# Patient Record
Sex: MALE | Race: WHITE | NOT HISPANIC OR LATINO | Employment: OTHER | URBAN - METROPOLITAN AREA
[De-identification: names, ages, dates, MRNs, and addresses within clinical notes are randomized per-mention and may not be internally consistent; named-entity substitution may affect disease eponyms.]

---

## 2018-06-14 ENCOUNTER — HOSPITAL ENCOUNTER (OUTPATIENT)
Dept: FAMILY MEDICINE CLINIC | Facility: CLINIC | Age: 68
Discharge: HOME | End: 2018-06-14
Attending: FAMILY MEDICINE | Admitting: FAMILY MEDICINE

## 2018-12-07 ENCOUNTER — ON CAMPUS - OUTPATIENT (AMBULATORY)
Dept: URBAN - METROPOLITAN AREA HOSPITAL 2 | Facility: HOSPITAL | Age: 68
End: 2018-12-07
Payer: MEDICARE

## 2018-12-07 VITALS
SYSTOLIC BLOOD PRESSURE: 139 MMHG | SYSTOLIC BLOOD PRESSURE: 122 MMHG | OXYGEN SATURATION: 98 % | SYSTOLIC BLOOD PRESSURE: 111 MMHG | HEART RATE: 74 BPM | DIASTOLIC BLOOD PRESSURE: 72 MMHG | SYSTOLIC BLOOD PRESSURE: 110 MMHG | RESPIRATION RATE: 16 BRPM | DIASTOLIC BLOOD PRESSURE: 76 MMHG | OXYGEN SATURATION: 100 % | RESPIRATION RATE: 18 BRPM | SYSTOLIC BLOOD PRESSURE: 113 MMHG | HEART RATE: 77 BPM | DIASTOLIC BLOOD PRESSURE: 73 MMHG | HEART RATE: 73 BPM | HEART RATE: 76 BPM | WEIGHT: 192 LBS | DIASTOLIC BLOOD PRESSURE: 70 MMHG | OXYGEN SATURATION: 97 % | HEART RATE: 78 BPM | HEIGHT: 72 IN | DIASTOLIC BLOOD PRESSURE: 84 MMHG | OXYGEN SATURATION: 99 % | HEART RATE: 79 BPM | TEMPERATURE: 97.5 F | DIASTOLIC BLOOD PRESSURE: 75 MMHG | SYSTOLIC BLOOD PRESSURE: 115 MMHG | OXYGEN SATURATION: 96 %

## 2018-12-07 DIAGNOSIS — K57.30 DIVERTICULOSIS OF LARGE INTESTINE WITHOUT PERFORATION OR ABS: ICD-10-CM

## 2018-12-07 DIAGNOSIS — Z86.010 PERSONAL HISTORY OF COLONIC POLYPS: ICD-10-CM

## 2018-12-07 DIAGNOSIS — K64.1 SECOND DEGREE HEMORRHOIDS: ICD-10-CM

## 2018-12-07 PROCEDURE — G0105 COLORECTAL SCRN; HI RISK IND: HCPCS | Performed by: INTERNAL MEDICINE

## 2019-07-08 ENCOUNTER — RESULTS ENCOUNTER (OUTPATIENT)
Dept: FAMILY MEDICINE CLINIC | Facility: CLINIC | Age: 69
End: 2019-07-08

## 2019-07-08 DIAGNOSIS — I10 HYPERTENSION, UNSPECIFIED TYPE: ICD-10-CM

## 2019-07-08 DIAGNOSIS — E78.5 HYPERLIPIDEMIA, UNSPECIFIED HYPERLIPIDEMIA TYPE: ICD-10-CM

## 2019-07-08 DIAGNOSIS — Z12.5 SCREENING FOR PROSTATE CANCER: Primary | ICD-10-CM

## 2019-07-08 DIAGNOSIS — Z12.5 SCREENING FOR PROSTATE CANCER: ICD-10-CM

## 2019-07-09 LAB
ALBUMIN SERPL-MCNC: 4.6 G/DL (ref 3.6–4.8)
ALBUMIN/GLOB SERPL: 2.3 {RATIO} (ref 1.2–2.2)
ALP SERPL-CCNC: 82 IU/L (ref 39–117)
ALT SERPL-CCNC: 15 IU/L (ref 0–44)
AMBIG ABBREV CMP14 DEFAULT: NORMAL
AMBIG ABBREV LP DEFAULT: NORMAL
AST SERPL-CCNC: 16 IU/L (ref 0–40)
BILIRUB SERPL-MCNC: 0.7 MG/DL (ref 0–1.2)
BUN SERPL-MCNC: 12 MG/DL (ref 8–27)
BUN/CREAT SERPL: 15 (ref 10–24)
CALCIUM SERPL-MCNC: 9.4 MG/DL (ref 8.6–10.2)
CHLORIDE SERPL-SCNC: 106 MMOL/L (ref 96–106)
CHOLEST SERPL-MCNC: 201 MG/DL (ref 100–199)
CO2 SERPL-SCNC: 21 MMOL/L (ref 20–29)
CREAT SERPL-MCNC: 0.82 MG/DL (ref 0.76–1.27)
GLOBULIN SER CALC-MCNC: 2 G/DL (ref 1.5–4.5)
GLUCOSE SERPL-MCNC: 94 MG/DL (ref 65–99)
HDLC SERPL-MCNC: 41 MG/DL
LDLC SERPL CALC-MCNC: 134 MG/DL (ref 0–99)
POTASSIUM SERPL-SCNC: 4.2 MMOL/L (ref 3.5–5.2)
PROT SERPL-MCNC: 6.6 G/DL (ref 6–8.5)
PSA SERPL-MCNC: 1.4 NG/ML (ref 0–4)
SODIUM SERPL-SCNC: 142 MMOL/L (ref 134–144)
TRIGL SERPL-MCNC: 131 MG/DL (ref 0–149)
VLDLC SERPL CALC-MCNC: 26 MG/DL (ref 5–40)

## 2019-08-05 PROBLEM — M25.562 KNEE PAIN, LEFT: Status: ACTIVE | Noted: 2017-12-11

## 2019-08-05 PROBLEM — I10 BENIGN ESSENTIAL HYPERTENSION: Status: ACTIVE | Noted: 2017-12-11

## 2019-08-05 PROBLEM — K59.00 CONSTIPATION: Status: ACTIVE | Noted: 2018-10-01

## 2019-08-05 PROBLEM — M54.89 OTHER DORSALGIA: Status: ACTIVE | Noted: 2018-06-14

## 2019-08-05 PROBLEM — N40.0 BENIGN PROSTATIC HYPERPLASIA: Status: ACTIVE | Noted: 2018-04-02

## 2019-08-05 PROBLEM — G47.30 SLEEP APNEA: Status: ACTIVE | Noted: 2019-08-05

## 2019-08-05 PROBLEM — N39.0 RECURRENT URINARY TRACT INFECTION: Status: ACTIVE | Noted: 2017-10-10

## 2019-08-05 PROBLEM — E78.5 HYPERLIPIDEMIA: Status: ACTIVE | Noted: 2019-08-05

## 2019-08-05 PROBLEM — M17.12 DEGENERATIVE JOINT DISEASE OF LEFT KNEE: Status: ACTIVE | Noted: 2018-01-11

## 2019-08-05 PROBLEM — L82.1 SEBORRHEIC KERATOSIS: Status: ACTIVE | Noted: 2017-12-11

## 2019-08-05 RX ORDER — DOCUSATE SODIUM 100 MG/1
1 CAPSULE, LIQUID FILLED ORAL 2 TIMES DAILY
COMMUNITY
Start: 2019-04-01

## 2019-08-05 RX ORDER — ASPIRIN 81 MG/1
1 TABLET ORAL DAILY
COMMUNITY
Start: 2019-04-01

## 2019-08-05 RX ORDER — DICLOFENAC SODIUM 75 MG/1
1 TABLET, DELAYED RELEASE ORAL 2 TIMES DAILY PRN
Refills: 5 | COMMUNITY
Start: 2019-05-08 | End: 2019-09-26 | Stop reason: SDUPTHER

## 2019-08-05 RX ORDER — ZINC GLUCONATE 50 MG
1 TABLET ORAL DAILY
COMMUNITY
Start: 2012-05-30

## 2019-08-05 RX ORDER — ACETAMINOPHEN 500 MG
2 TABLET ORAL DAILY
COMMUNITY
Start: 2018-01-11 | End: 2019-08-06

## 2019-08-05 RX ORDER — LISINOPRIL 10 MG/1
1 TABLET ORAL DAILY
Refills: 2 | COMMUNITY
Start: 2019-06-05 | End: 2019-09-05 | Stop reason: SDUPTHER

## 2019-08-05 RX ORDER — LYSINE 500 MG
1 TABLET ORAL DAILY
COMMUNITY
Start: 2019-04-01

## 2019-08-05 RX ORDER — MELATONIN
1 DAILY
COMMUNITY
Start: 2018-01-11

## 2019-08-05 RX ORDER — TAMSULOSIN HYDROCHLORIDE 0.4 MG/1
1 CAPSULE ORAL DAILY
Refills: 3 | COMMUNITY
Start: 2019-05-02 | End: 2019-09-26 | Stop reason: SDUPTHER

## 2019-08-06 ENCOUNTER — OFFICE VISIT (OUTPATIENT)
Dept: FAMILY MEDICINE CLINIC | Facility: CLINIC | Age: 69
End: 2019-08-06

## 2019-08-06 VITALS
HEART RATE: 71 BPM | TEMPERATURE: 97.8 F | BODY MASS INDEX: 27.09 KG/M2 | WEIGHT: 200 LBS | RESPIRATION RATE: 18 BRPM | HEIGHT: 72 IN | SYSTOLIC BLOOD PRESSURE: 111 MMHG | OXYGEN SATURATION: 97 % | DIASTOLIC BLOOD PRESSURE: 71 MMHG

## 2019-08-06 DIAGNOSIS — M51.36 LUMBAR DEGENERATIVE DISC DISEASE: ICD-10-CM

## 2019-08-06 DIAGNOSIS — M54.50 ACUTE LEFT-SIDED LOW BACK PAIN WITHOUT SCIATICA: Primary | ICD-10-CM

## 2019-08-06 PROBLEM — M51.369 LUMBAR DEGENERATIVE DISC DISEASE: Status: ACTIVE | Noted: 2019-08-06

## 2019-08-06 PROCEDURE — 99213 OFFICE O/P EST LOW 20 MIN: CPT | Performed by: FAMILY MEDICINE

## 2019-08-06 RX ORDER — PREDNISONE 20 MG/1
40 TABLET ORAL DAILY
Qty: 10 TABLET | Refills: 0 | Status: SHIPPED | OUTPATIENT
Start: 2019-08-06 | End: 2019-08-11

## 2019-08-06 RX ORDER — CYCLOBENZAPRINE HCL 5 MG
5 TABLET ORAL
Qty: 10 TABLET | Refills: 0 | Status: SHIPPED | OUTPATIENT
Start: 2019-08-06 | End: 2019-09-26

## 2019-08-06 NOTE — PROGRESS NOTES
Subjective   Malvin Bustillo is a 69 y.o. male.   Chief Complaint   Patient presents with   • Back Pain         History of Present Illness   Malvin presents today with  c/o low to mid back pain that radiates to his left lower back.  It started on July 5 when he was cutting wood. He states that the pain has improved. However, when he steps on an unlevel surface or turns quickly, then the pain will worsen, but remains limited to his low back.  He does not have any radiation down into his buttocks or into his legs.  He has no loss of bowel or bladder control.  No loss of leg strength.. He states that he has been using heat with some relief. Typically sees Dr. Green, and he plans to talk to her at her next visit about his future plans.    Patient Active Problem List    Diagnosis Date Noted   • Lumbar degenerative disc disease 08/06/2019     Note Last Updated: 8/6/2019     Imaged on abd series 2018     • Hyperlipidemia 08/05/2019   • Sleep apnea 08/05/2019   • Constipation 10/01/2018   • Other dorsalgia 06/14/2018   • Benign prostatic hyperplasia 04/02/2018   • Degenerative joint disease of left knee 01/11/2018   • Benign essential hypertension 12/11/2017   • Knee pain, left 12/11/2017   • Seborrheic keratosis 12/11/2017   • Recurrent urinary tract infection 10/10/2017   • Adenomatous polyp of colon 06/20/2016   • Body mass index (BMI) of 25.0 to 29.9 12/21/2015   • Intermittent vertigo 12/05/2014   • Fasting hyperglycemia 12/18/2013   • Degenerative joint disease 01[birth year]01/1960           Past Surgical History:   Procedure Laterality Date   • COLONOSCOPY  2013    Recheck 2023   • HEMORRHOIDECTOMY     • INGUINAL HERNIA REPAIR Left    • ORCHIECTOMY Left     secondary due to hernia incarceration   • TONSILLECTOMY AND ADENOIDECTOMY         Current Outpatient Medications:   •  aspirin (ASPIRIN ADULT LOW DOSE) 81 MG EC tablet, Take 1 tablet by mouth Daily., Disp: , Rfl:   •  cholecalciferol (VITAMIN D3) 1000 units tablet, Take 1 tablet  by mouth Daily., Disp: , Rfl:   •  docusate sodium (COLACE) 100 MG capsule, Take 1 capsule by mouth 3 (Three) Times a Day., Disp: , Rfl:   •  Fish Oil-Cholecalciferol (FISH OIL + D3 PO), Take 2 capsules by mouth Daily., Disp: , Rfl:   •  L-Lysine 500 MG tablet tablet, Take 1 tablet by mouth Daily., Disp: , Rfl:   •  MULTIPLE VITAMIN PO, Take 1 tablet by mouth Daily., Disp: , Rfl:   •  Zinc 50 MG tablet, Take 1 tablet by mouth Daily., Disp: , Rfl:   •  cyclobenzaprine (FLEXERIL) 5 MG tablet, Take 1 tablet by mouth every night at bedtime., Disp: 10 tablet, Rfl: 0  •  diclofenac (VOLTAREN) 75 MG EC tablet, Take 1 tablet by mouth 2 (Two) Times a Day As Needed., Disp: , Rfl: 5  •  lisinopril (PRINIVIL,ZESTRIL) 10 MG tablet, Take 1 tablet by mouth Daily., Disp: , Rfl: 2  •  predniSONE (DELTASONE) 20 MG tablet, Take 2 tablets by mouth Daily for 5 days., Disp: 10 tablet, Rfl: 0  •  tamsulosin (FLOMAX) 0.4 MG capsule 24 hr capsule, Take 1 capsule by mouth Daily., Disp: , Rfl: 3  No Known Allergies  Social History     Socioeconomic History   • Marital status:      Spouse name: Not on file   • Number of children: Not on file   • Years of education: Not on file   • Highest education level: Not on file   Tobacco Use   • Smoking status: Former Smoker     Packs/day: 2.00     Types: Cigarettes     Start date:      Last attempt to quit:      Years since quittin.6   • Smokeless tobacco: Never Used   Substance and Sexual Activity   • Alcohol use: Yes     Alcohol/week: 12.6 - 16.8 oz     Types: 21 - 28 Cans of beer per week     Frequency: 4 or more times a week   • Drug use: No     Family History   Problem Relation Age of Onset   • Hypertension Mother    • Coronary artery disease Mother    • Diabetes type II Father    • Heart attack Father    • Hypertension Brother         pacers     The following portions of the patient's history were reviewed and updated as appropriate: allergies, current medications, past family  "history, past medical history, past social history, past surgical history and problem list.    Review of Systems   Constitutional: Negative for chills, fever and unexpected weight loss (intentional loss of 30# over past 3 years).   Respiratory: Negative for cough and shortness of breath.    Cardiovascular: Negative for chest pain and leg swelling.   Gastrointestinal: Negative for abdominal pain.   Endocrine: Negative for cold intolerance and heat intolerance.   Genitourinary: Negative for urinary incontinence.   Musculoskeletal: Negative for gait problem.   Neurological: Negative for dizziness and headache.   Hematological: Does not bruise/bleed easily.       Objective   /71 (BP Location: Left arm, Patient Position: Sitting, Cuff Size: Large Adult)   Pulse 71   Temp 97.8 °F (36.6 °C) (Oral)   Resp 18   Ht 182.9 cm (72\")   Wt 90.7 kg (200 lb)   SpO2 97%   BMI 27.12 kg/m²   Physical Exam   Constitutional: He is oriented to person, place, and time. He appears well-developed and well-nourished.   HENT:   Head: Normocephalic and atraumatic.   Eyes: Conjunctivae and EOM are normal.   Cardiovascular: Normal rate.   Pulmonary/Chest: Effort normal.   Musculoskeletal: Normal range of motion. He exhibits no edema.   Neurological: He is alert and oriented to person, place, and time. He displays normal reflexes. No sensory deficit.   Straight leg raise test negative bilaterally.  He does have palpable muscle spasm in the left lower back.  He has limited flexion of his lumbar spine.  His entire lower back over the lumbar spine is rigid, suggesting significant degeneration               Assessment/Plan   Diagnoses and all orders for this visit:    1. Acute left-sided low back pain without sciatica (Primary)  -     predniSONE (DELTASONE) 20 MG tablet; Take 2 tablets by mouth Daily for 5 days.  Dispense: 10 tablet; Refill: 0    2. Lumbar degenerative disc disease  -     cyclobenzaprine (FLEXERIL) 5 MG tablet; Take 1 " tablet by mouth every night at bedtime.  Dispense: 10 tablet; Refill: 0    Will try Flexeril low-dose at bedtime only.  A burst of prednisone for 5 days.  Continue to use the heating pad on his back.  I provided him to handouts on stretching exercises and back injury prevention.             Return if symptoms worsen or fail to improve.

## 2019-08-06 NOTE — PATIENT INSTRUCTIONS
Back Exercises  The following exercises strengthen the muscles that help to support the back. They also help to keep the lower back flexible. Doing these exercises can help to prevent back pain or lessen existing pain.  If you have back pain or discomfort, try doing these exercises 2-3 times each day or as told by your health care provider. When the pain goes away, do them once each day, but increase the number of times that you repeat the steps for each exercise (do more repetitions). If you do not have back pain or discomfort, do these exercises once each day or as told by your health care provider.  Exercises  Single Knee to Chest  Repeat these steps 3-5 times for each le. Lie on your back on a firm bed or the floor with your legs extended.  2. Bring one knee to your chest. Your other leg should stay extended and in contact with the floor.  3. Hold your knee in place by grabbing your knee or thigh.  4. Pull on your knee until you feel a gentle stretch in your lower back.  5. Hold the stretch for 10-30 seconds.  6. Slowly release and straighten your leg.  Pelvic Tilt  Repeat these steps 5-10 times:  1. Lie on your back on a firm bed or the floor with your legs extended.  2. Bend your knees so they are pointing toward the ceiling and your feet are flat on the floor.  3. Tighten your lower abdominal muscles to press your lower back against the floor. This motion will tilt your pelvis so your tailbone points up toward the ceiling instead of pointing to your feet or the floor.  4. With gentle tension and even breathing, hold this position for 5-10 seconds.  Cat-Cow  Repeat these steps until your lower back becomes more flexible:  1. Get into a hands-and-knees position on a firm surface. Keep your hands under your shoulders, and keep your knees under your hips. You may place padding under your knees for comfort.  2. Let your head hang down, and point your tailbone toward the floor so your lower back becomes  rounded like the back of a cat.  3. Hold this position for 5 seconds.  4. Slowly lift your head and point your tailbone up toward the ceiling so your back forms a sagging arch like the back of a cow.  5. Hold this position for 5 seconds.    Press-Ups  Repeat these steps 5-10 times:  1. Lie on your abdomen (face-down) on the floor.  2. Place your palms near your head, about shoulder-width apart.  3. While you keep your back as relaxed as possible and keep your hips on the floor, slowly straighten your arms to raise the top half of your body and lift your shoulders. Do not use your back muscles to raise your upper torso. You may adjust the placement of your hands to make yourself more comfortable.  4. Hold this position for 5 seconds while you keep your back relaxed.  5. Slowly return to lying flat on the floor.    Bridges  Repeat these steps 10 times:  1. Lie on your back on a firm surface.  2. Bend your knees so they are pointing toward the ceiling and your feet are flat on the floor.  3. Tighten your buttocks muscles and lift your buttocks off of the floor until your waist is at almost the same height as your knees. You should feel the muscles working in your buttocks and the back of your thighs. If you do not feel these muscles, slide your feet 1-2 inches farther away from your buttocks.  4. Hold this position for 3-5 seconds.  5. Slowly lower your hips to the starting position, and allow your buttocks muscles to relax completely.  If this exercise is too easy, try doing it with your arms crossed over your chest.  Abdominal Crunches  Repeat these steps 5-10 times:  1. Lie on your back on a firm bed or the floor with your legs extended.  2. Bend your knees so they are pointing toward the ceiling and your feet are flat on the floor.  3. Cross your arms over your chest.  4. Tip your chin slightly toward your chest without bending your neck.  5. Tighten your abdominal muscles and slowly raise your trunk (torso) high  enough to lift your shoulder blades a tiny bit off of the floor. Avoid raising your torso higher than that, because it can put too much stress on your low back and it does not help to strengthen your abdominal muscles.  6. Slowly return to your starting position.  Back Lifts  Repeat these steps 5-10 times:  1. Lie on your abdomen (face-down) with your arms at your sides, and rest your forehead on the floor.  2. Tighten the muscles in your legs and your buttocks.  3. Slowly lift your chest off of the floor while you keep your hips pressed to the floor. Keep the back of your head in line with the curve in your back. Your eyes should be looking at the floor.  4. Hold this position for 3-5 seconds.  5. Slowly return to your starting position.  Contact a health care provider if:  · Your back pain or discomfort gets much worse when you do an exercise.  · Your back pain or discomfort does not lessen within 2 hours after you exercise.  If you have any of these problems, stop doing these exercises right away. Do not do them again unless your health care provider says that you can.  Get help right away if:  · You develop sudden, severe back pain. If this happens, stop doing the exercises right away. Do not do them again unless your health care provider says that you can.  This information is not intended to replace advice given to you by your health care provider. Make sure you discuss any questions you have with your health care provider.  Document Released: 01/25/2006 Document Revised: 07/31/2018 Document Reviewed: 02/11/2016  [employer] Interactive Patient Education © 2019 [employer]    Back Injury Prevention  Back injuries can be very painful. They can also be difficult to heal. After having one back injury, you are more likely to have another one again. It is important to learn how to avoid injuring or re-injuring your back. The following tips can help you to prevent a back injury.  What actions can I take to prevent  back injuries?  Changes in your diet  Talk with your doctor about what to eat. Some foods can make the bones strong.  · Talk with your doctor about how much calcium and vitamin D you need each day. These nutrients help to prevent weakening of the bones (osteoporosis).  · Eat foods that have calcium. These include:  ? Dairy products.  ? Green leafy vegetables.  ? Food and drinks that have had calcium added to them (fortified).  · Eat foods that have vitamin D. These include:  ? Milk.  ? Food and drinks that have had vitamin D added to them.  · Take other supplements and vitamins only as told by your doctor.  Physical fitness  Physical fitness makes your bones and muscles strong. It also improves your balance and strength.  · Exercise for 30 minutes per day on most days of the week, or as told by your doctor. Make sure to:  ? Do aerobic exercises, such as walking, jogging, biking, or swimming.  ? Do exercises that increase balance and strength, such as natalie chi and yoga.  ? Do stretching exercises. This helps with flexibility.  ? Develop strong belly (abdominal) muscles. Your belly muscles help to support your back.  · Stay at a healthy weight. This lowers your risk of a back injury.  Good posture  Prevent back injuries by developing and maintaining a good posture. To do this:  · Sit up straight and stand up straight. Avoid leaning forward when you sit or hunching over when you stand.  · Choose chairs that have good low-back (lumbar) support.  · If you work at a desk:  ? Sit close to it so you do not need to lean over.  ? Keep your chin tucked in.  ? Keep your neck drawn back.  ? Keep your elbows bent so that your arms make a corner (right angle).  · When you drive:  ? Sit high and close to the steering wheel. Add a low-back support to your car seat, if needed.  ? Take breaks every hour if you are driving for long periods of time.  · Avoid sitting or standing in one position for very long. Take breaks to get up,  stretch, and walk around at least once every hour.  · Sleep on your side with your knees slightly bent, or sleep on your back with a pillow under your knees.    Lifting, twisting, and reaching    · Heavy lifting  ? Avoid heavy lifting, especially lifting over and over again. If you must do heavy lifting:  ? Stretch before lifting.  ? Work slowly.  ? Rest between lifts.  ? Use a tool such as a cart or a krish to move objects if one is available.  ? Make several small trips instead of carrying one heavy load.  ? Ask for help when you need it, especially when moving big objects.  ? Follow these steps when lifting:  ? Stand with your feet shoulder-width apart.  ? Get as close to the object as you can. Do not  a heavy object that is far from your body.  ? Use handles or lifting straps if they are available.  ? Bend at your knees. Squat down, but keep your heels off the floor.  ? Keep your shoulders back. Keep your chin tucked in. Keep your back straight.  ? Lift the object slowly while you tighten the muscles in your legs, belly, and bottom. Keep the object as close to the center of your body as possible.  ? Follow these steps when putting down a heavy load:  ? Stand with your feet shoulder-width apart.  ? Lower the object slowly while you tighten the muscles in your legs, belly, and bottom. Keep the object as close to the center of your body as possible.  ? Keep your shoulders back. Keep your chin tucked in. Keep your back straight.  ? Bend at your knees. Squat down, but keep your heels off the floor.  ? Use handles or lifting straps if they are available.  · Twisting and reaching  ? Avoid lifting heavy objects above your waist.  ? Do not twist at your waist while you are lifting or carrying a load. If you need to turn, move your feet.  ? Do not bend over without bending at your knees.  ? Avoid reaching over your head, across a table, or for an object on a high surface.  Other things to do    · Avoid wet floors  and icy ground. Keep sidewalks clear of ice to prevent falls.  · Do not sleep on a mattress that is too soft or too hard.  · Store heavier objects on shelves at waist level.  · Store lighter objects on lower or higher shelves.  · Find ways to lower your stress, such as:  ? Exercise.  ? Massage.  ? Relaxation techniques.  · Talk with your doctor if you feel anxious or depressed. These conditions can make back pain worse.  · Wear flat heel shoes with cushioned soles.  · Use both shoulder straps when carrying a backpack.  · Do not use any products that contain nicotine or tobacco, such as cigarettes and e-cigarettes. If you need help quitting, ask your doctor.  Summary  · Back injuries can be very painful and difficult to heal.  · You can keep your back healthy by making certain changes. These include eating foods that make bones strong, working on being physically fit, developing a good posture, and lifting heavy objects in a safe way.  This information is not intended to replace advice given to you by your health care provider. Make sure you discuss any questions you have with your health care provider.  Document Released: 06/05/2009 Document Revised: 02/08/2019 Document Reviewed: 02/08/2019  [employer] Â® Interactive Patient Education © 2019 [employer] Â[employer]

## 2019-09-06 RX ORDER — LISINOPRIL 10 MG/1
TABLET ORAL
Qty: 30 TABLET | Refills: 0 | Status: SHIPPED | OUTPATIENT
Start: 2019-09-06 | End: 2019-09-26

## 2019-09-26 ENCOUNTER — OFFICE VISIT (OUTPATIENT)
Dept: FAMILY MEDICINE CLINIC | Facility: CLINIC | Age: 69
End: 2019-09-26

## 2019-09-26 VITALS
DIASTOLIC BLOOD PRESSURE: 67 MMHG | TEMPERATURE: 98 F | HEART RATE: 65 BPM | BODY MASS INDEX: 27.25 KG/M2 | SYSTOLIC BLOOD PRESSURE: 108 MMHG | OXYGEN SATURATION: 96 % | RESPIRATION RATE: 16 BRPM | HEIGHT: 72 IN | WEIGHT: 201.2 LBS

## 2019-09-26 DIAGNOSIS — N40.0 BENIGN PROSTATIC HYPERPLASIA WITHOUT LOWER URINARY TRACT SYMPTOMS: ICD-10-CM

## 2019-09-26 DIAGNOSIS — I10 ESSENTIAL HYPERTENSION: Primary | ICD-10-CM

## 2019-09-26 DIAGNOSIS — E78.5 HYPERLIPIDEMIA, UNSPECIFIED HYPERLIPIDEMIA TYPE: ICD-10-CM

## 2019-09-26 DIAGNOSIS — Z23 NEED FOR IMMUNIZATION AGAINST INFLUENZA: ICD-10-CM

## 2019-09-26 PROBLEM — G47.33 OSA ON CPAP: Status: ACTIVE | Noted: 2019-09-26

## 2019-09-26 PROBLEM — Z99.89 OSA ON CPAP: Status: ACTIVE | Noted: 2019-09-26

## 2019-09-26 PROCEDURE — 99213 OFFICE O/P EST LOW 20 MIN: CPT | Performed by: FAMILY MEDICINE

## 2019-09-26 PROCEDURE — 90653 IIV ADJUVANT VACCINE IM: CPT | Performed by: FAMILY MEDICINE

## 2019-09-26 PROCEDURE — G0008 ADMIN INFLUENZA VIRUS VAC: HCPCS | Performed by: FAMILY MEDICINE

## 2019-09-26 RX ORDER — ROSUVASTATIN CALCIUM 5 MG/1
5 TABLET, COATED ORAL DAILY
Qty: 90 TABLET | Refills: 1 | Status: SHIPPED | OUTPATIENT
Start: 2019-09-26 | End: 2020-01-27 | Stop reason: SDUPTHER

## 2019-09-26 RX ORDER — LISINOPRIL 5 MG/1
5 TABLET ORAL DAILY
Qty: 90 TABLET | Refills: 1 | Status: SHIPPED | OUTPATIENT
Start: 2019-09-26 | End: 2020-01-27 | Stop reason: SDUPTHER

## 2019-09-26 RX ORDER — TAMSULOSIN HYDROCHLORIDE 0.4 MG/1
1 CAPSULE ORAL DAILY
Qty: 90 CAPSULE | Refills: 3 | Status: SHIPPED | OUTPATIENT
Start: 2019-09-26 | End: 2020-01-27 | Stop reason: SDUPTHER

## 2019-09-26 RX ORDER — BENZOYL PEROXIDE 100 MG/ML
1 LIQUID TOPICAL DAILY
Refills: 5 | COMMUNITY
Start: 2019-09-18 | End: 2022-08-26

## 2019-09-26 RX ORDER — CLINDAMYCIN PHOSPHATE 11.9 MG/ML
1 SOLUTION TOPICAL DAILY
Refills: 5 | COMMUNITY
Start: 2019-09-18 | End: 2022-08-26

## 2019-09-26 NOTE — PROGRESS NOTES
Subjective   Malvin Bustillo is a 69 y.o. male.     Chief Complaint   Patient presents with   • Hypertension   • Hyperlipidemia   • Benign Prostatic Hypertrophy         Current Outpatient Medications:   •  aspirin (ASPIRIN ADULT LOW DOSE) 81 MG EC tablet, Take 1 tablet by mouth Daily., Disp: , Rfl:   •  BENZOYL PEROXIDE 10 % external wash, Apply 1 application topically to the appropriate area as directed 2 (Two) Times a Day., Disp: , Rfl: 5  •  cholecalciferol (VITAMIN D3) 1000 units tablet, Take 1 tablet by mouth Daily., Disp: , Rfl:   •  clindamycin (CLEOCIN T) 1 % external solution, Apply 1 application topically to the appropriate area as directed 2 (Two) Times a Day., Disp: , Rfl: 5  •  diclofenac (VOLTAREN) 50 MG EC tablet, Take 1 tablet by mouth 2 (Two) Times a Day As Needed (joint pain)., Disp: 180 tablet, Rfl: 0  •  docusate sodium (COLACE) 100 MG capsule, Take 1 capsule by mouth 3 (Three) Times a Day., Disp: , Rfl:   •  Fish Oil-Cholecalciferol (FISH OIL + D3 PO), Take 2 capsules by mouth Daily., Disp: , Rfl:   •  L-Lysine 500 MG tablet tablet, Take 1 tablet by mouth Daily., Disp: , Rfl:   •  lisinopril (PRINIVIL,ZESTRIL) 5 MG tablet, Take 1 tablet by mouth Daily., Disp: 90 tablet, Rfl: 1  •  MULTIPLE VITAMIN PO, Take 1 tablet by mouth Daily., Disp: , Rfl:   •  tamsulosin (FLOMAX) 0.4 MG capsule 24 hr capsule, Take 1 capsule by mouth Daily., Disp: 90 capsule, Rfl: 3  •  Zinc 50 MG tablet, Take 1 tablet by mouth Daily., Disp: , Rfl:   •  rosuvastatin (CRESTOR) 5 MG tablet, Take 1 tablet by mouth Daily., Disp: 90 tablet, Rfl: 1    Past Medical History:   Diagnosis Date   • Arthritis    • Hyperlipidemia    • JERONIMO on CPAP     Dr. Nur   • PSA     2018 =   • VACCINES     FLU= 2018/ 2019       Past Surgical History:   Procedure Laterality Date   • COLONOSCOPY  2013 2013= TA/ 2018= NEG, Rech 2023   • HEMORRHOIDECTOMY     • INGUINAL HERNIA REPAIR Left    • ORCHIECTOMY Left     secondary due to hernia incarceration    • TONSILLECTOMY AND ADENOIDECTOMY         Family History   Problem Relation Age of Onset   • Hypertension Mother    • Heart disease Mother    • Heart disease Father    • Diabetes Father    • Hypertension Brother    • Heart disease Brother    • No Known Problems Sister        Social History     Socioeconomic History   • Marital status:      Spouse name: Not on file   • Number of children: Not on file   • Years of education: Not on file   • Highest education level: Not on file   Tobacco Use   • Smoking status: Former Smoker     Packs/day: 2.00     Types: Cigarettes     Start date:      Last attempt to quit:      Years since quittin.7   • Smokeless tobacco: Never Used   Substance and Sexual Activity   • Alcohol use: Yes     Alcohol/week: 4.2 oz     Types: 7 Cans of beer per week     Frequency: 4 or more times a week   • Drug use: No       70 y/o C male here for 6mos f/u appt on HTN/ CHOL/ BPH    Pt doing well on current meds/ doses         The following portions of the patient's history were reviewed and updated as appropriate: allergies, current medications, past family history, past medical history, past social history, past surgical history and problem list.    Review of Systems   Constitutional: Negative for activity change, fatigue and unexpected weight gain.   Respiratory: Negative for cough, chest tightness and shortness of breath.    Cardiovascular: Negative for chest pain, palpitations and leg swelling.   Genitourinary: Negative for frequency, nocturia, urgency and urinary incontinence.   Musculoskeletal: Negative for arthralgias and myalgias.   Neurological: Negative for dizziness, facial asymmetry, speech difficulty, weakness, light-headedness, headache, memory problem and confusion.       Vitals:    19 0802   BP: 108/67   Pulse: 65   Resp: 16   Temp: 98 °F (36.7 °C)   SpO2: 96%       Objective   Physical Exam   Constitutional: He is oriented to person, place, and time. He  appears well-developed and well-nourished.   HENT:   Head: Normocephalic and atraumatic.   Neck: Normal range of motion. Neck supple. Normal carotid pulses present. Carotid bruit is not present.   Cardiovascular: Normal rate, regular rhythm, normal heart sounds and intact distal pulses.   No murmur heard.  Pulmonary/Chest: Effort normal and breath sounds normal.   Musculoskeletal: He exhibits no edema.   Neurological: He is alert and oriented to person, place, and time. No cranial nerve deficit.   Skin: Skin is warm and dry. No rash noted.   Psychiatric: He has a normal mood and affect. His behavior is normal. Judgment and thought content normal.   Nursing note and vitals reviewed.        Assessment/Plan   Malvin was seen today for hypertension, hyperlipidemia and benign prostatic hypertrophy.    Diagnoses and all orders for this visit:    Essential hypertension    Hyperlipidemia, unspecified hyperlipidemia type  -     Lipid Panel; Future  -     Comprehensive Metabolic Panel; Future    Benign prostatic hyperplasia without lower urinary tract symptoms    Need for immunization against influenza  -     Fluad Quad >65 years    Other orders  -     lisinopril (PRINIVIL,ZESTRIL) 5 MG tablet; Take 1 tablet by mouth Daily.  -     rosuvastatin (CRESTOR) 5 MG tablet; Take 1 tablet by mouth Daily.  -     tamsulosin (FLOMAX) 0.4 MG capsule 24 hr capsule; Take 1 capsule by mouth Daily.  -     diclofenac (VOLTAREN) 50 MG EC tablet; Take 1 tablet by mouth 2 (Two) Times a Day As Needed (joint pain).

## 2020-01-27 ENCOUNTER — OFFICE VISIT (OUTPATIENT)
Dept: FAMILY MEDICINE CLINIC | Facility: CLINIC | Age: 70
End: 2020-01-27

## 2020-01-27 VITALS
DIASTOLIC BLOOD PRESSURE: 76 MMHG | HEIGHT: 72 IN | RESPIRATION RATE: 16 BRPM | WEIGHT: 204 LBS | TEMPERATURE: 97.8 F | SYSTOLIC BLOOD PRESSURE: 130 MMHG | BODY MASS INDEX: 27.63 KG/M2 | HEART RATE: 72 BPM | OXYGEN SATURATION: 96 %

## 2020-01-27 DIAGNOSIS — I10 BENIGN ESSENTIAL HYPERTENSION: ICD-10-CM

## 2020-01-27 DIAGNOSIS — E78.2 MIXED HYPERLIPIDEMIA: ICD-10-CM

## 2020-01-27 DIAGNOSIS — J01.00 ACUTE NON-RECURRENT MAXILLARY SINUSITIS: ICD-10-CM

## 2020-01-27 DIAGNOSIS — Z99.89 OSA ON CPAP: ICD-10-CM

## 2020-01-27 DIAGNOSIS — G47.33 OSA ON CPAP: ICD-10-CM

## 2020-01-27 DIAGNOSIS — M51.36 LUMBAR DEGENERATIVE DISC DISEASE: Primary | ICD-10-CM

## 2020-01-27 DIAGNOSIS — M15.9 PRIMARY OSTEOARTHRITIS INVOLVING MULTIPLE JOINTS: ICD-10-CM

## 2020-01-27 DIAGNOSIS — N40.0 BENIGN PROSTATIC HYPERPLASIA WITHOUT LOWER URINARY TRACT SYMPTOMS: ICD-10-CM

## 2020-01-27 PROCEDURE — 99214 OFFICE O/P EST MOD 30 MIN: CPT | Performed by: FAMILY MEDICINE

## 2020-01-27 RX ORDER — LISINOPRIL 5 MG/1
5 TABLET ORAL DAILY
Qty: 90 TABLET | Refills: 3 | Status: SHIPPED | OUTPATIENT
Start: 2020-01-27 | End: 2021-03-23

## 2020-01-27 RX ORDER — TAMSULOSIN HYDROCHLORIDE 0.4 MG/1
1 CAPSULE ORAL DAILY
Qty: 90 CAPSULE | Refills: 3 | Status: SHIPPED | OUTPATIENT
Start: 2020-01-27 | End: 2021-02-01

## 2020-01-27 RX ORDER — AZITHROMYCIN 250 MG/1
TABLET, FILM COATED ORAL
Qty: 6 TABLET | Refills: 0 | Status: SHIPPED | OUTPATIENT
Start: 2020-01-27 | End: 2020-07-27

## 2020-01-27 RX ORDER — ROSUVASTATIN CALCIUM 5 MG/1
5 TABLET, COATED ORAL DAILY
Qty: 90 TABLET | Refills: 3 | Status: SHIPPED | OUTPATIENT
Start: 2020-01-27 | End: 2021-03-01

## 2020-01-27 NOTE — PROGRESS NOTES
Subjective   Malvin Bustillo is a 69 y.o. male.   Chief Complaint   Patient presents with   • Hyperlipidemia     Needs labs rechecked for HLD. Fasting.    • Hypertension     Cheks BP at home since BP med was lowered. Lowest = 115/71; Highest = 159/84   • URI     Headache; Sore throat; Clear nasal drainage since last Tuesday; Denies post nasal drip    • Arthritis     Is on Diclofenac and needs r/f; Helps with arthralgia pain        History of Present Illness   Comes in today.  Transferring from Dr. Rosenthal.  Multitude of issues.  Hyperlipidemia was told he needed to have his cholesterol rechecked.  Last I can see he had a lipid panel in July LDL was 134, total cholesterol was 201.  Apparently had some problems with Lipitor and cannot take it.  He is taking Crestor 5 mg a day.    Hypertension-for some reason his lisinopril was lowered from 10-5.  He brings in his blood sugar log today.  Looks like he is checking his blood pressure twice a day largely ranges from 115 on the low end to 1 30-1 35 on the high end.  He is only had 3 numbers above 140.  1 of them was 142, 1 of them was 143 and the other was 159.  Diastolics all ranged from the 60s to low 80s.    Has osteoarthritis and degenerative joint disease including chronic mechanical back pain.  Takes diclofenac for that.  Would like a refill.    Finally he has respiratory symptoms including sore throat, clear nasal drainage for 1 week.  Mild headache.  No fevers or chills.  Also developing a lot of pressure on either side of his nose and over his forehead.  Feels a little off balance when he walks around.  Scant blood on the tissue when he blows his nose.      Patient Active Problem List    Diagnosis Date Noted   • JERONIMO on CPAP 09/26/2019     Note Last Updated: 9/26/2019     Dr. Nur     • Lumbar degenerative disc disease 08/06/2019     Note Last Updated: 8/6/2019     Imaged on abd series 2018     • Mixed hyperlipidemia 08/05/2019   • Sleep apnea 08/05/2019   •  Constipation 10/01/2018   • Other dorsalgia 06/14/2018   • Benign prostatic hyperplasia 04/02/2018   • Degenerative joint disease of left knee 01/11/2018   • Benign essential hypertension 12/11/2017   • Knee pain, left 12/11/2017   • Seborrheic keratosis 12/11/2017   • Recurrent urinary tract infection 10/10/2017   • Adenomatous polyp of colon 06/20/2016   • Body mass index (BMI) of 25.0 to 29.9 12/21/2015   • Intermittent vertigo 12/05/2014   • Fasting hyperglycemia 12/18/2013   • Degenerative joint disease 01/01/1960           Past Surgical History:   Procedure Laterality Date   • COLONOSCOPY  2013 2013= TA/ 2018= NEG, Rech 2023   • HEMORRHOIDECTOMY     • INGUINAL HERNIA REPAIR Left    • ORCHIECTOMY Left     secondary due to hernia incarceration   • TONSILLECTOMY AND ADENOIDECTOMY       Current Outpatient Medications on File Prior to Visit   Medication Sig   • aspirin (ASPIRIN ADULT LOW DOSE) 81 MG EC tablet Take 1 tablet by mouth Daily.   • BENZOYL PEROXIDE 10 % external wash Apply 1 application topically to the appropriate area as directed Daily.   • cholecalciferol (VITAMIN D3) 1000 units tablet Take 1 tablet by mouth Daily.   • clindamycin (CLEOCIN T) 1 % external solution Apply 1 application topically to the appropriate area as directed Daily.   • docusate sodium (COLACE) 100 MG capsule Take 1 capsule by mouth 2 (Two) Times a Day.   • Fish Oil-Cholecalciferol (FISH OIL + D3 PO) Take 2 capsules by mouth Daily.   • L-Lysine 500 MG tablet tablet Take 1 tablet by mouth Daily.   • MULTIPLE VITAMIN PO Take 1 tablet by mouth Daily.   • Zinc 50 MG tablet Take 1 tablet by mouth Daily.   • [DISCONTINUED] diclofenac (VOLTAREN) 50 MG EC tablet Take 1 tablet by mouth 2 (Two) Times a Day As Needed (joint pain).   • [DISCONTINUED] lisinopril (PRINIVIL,ZESTRIL) 5 MG tablet Take 1 tablet by mouth Daily.   • [DISCONTINUED] rosuvastatin (CRESTOR) 5 MG tablet Take 1 tablet by mouth Daily.   • [DISCONTINUED] tamsulosin  (FLOMAX) 0.4 MG capsule 24 hr capsule Take 1 capsule by mouth Daily.     No current facility-administered medications on file prior to visit.      Allergies   Allergen Reactions   • Lipitor [Atorvastatin Calcium] Myalgia     Social History     Socioeconomic History   • Marital status:      Spouse name: Not on file   • Number of children: Not on file   • Years of education: Not on file   • Highest education level: Not on file   Tobacco Use   • Smoking status: Former Smoker     Packs/day: 2.00     Years: 10.00     Pack years: 20.00     Types: Cigarettes     Start date:      Last attempt to quit:      Years since quittin.0   • Smokeless tobacco: Never Used   Substance and Sexual Activity   • Alcohol use: Yes     Alcohol/week: 7.0 standard drinks     Types: 7 Cans of beer per week     Frequency: 4 or more times a week   • Drug use: No     Family History   Problem Relation Age of Onset   • Hypertension Mother    • Heart disease Mother    • Depression Mother    • Osteoporosis Mother    • Skin cancer Mother    • Thyroid disease Mother    • Coronary artery disease Mother    • Heart disease Father    • Diabetes Father    • Coronary artery disease Father    • Hypertension Brother    • Heart disease Brother    • No Known Problems Sister      The following portions of the patient's history were reviewed and updated as appropriate: allergies, current medications, past family history, past medical history, past social history, past surgical history and problem list.    Review of Systems   Constitutional: Positive for fatigue. Negative for chills and fever.   Respiratory: Negative for shortness of breath.    Cardiovascular: Negative for chest pain, palpitations and leg swelling.   Gastrointestinal: Negative for abdominal pain.   Musculoskeletal: Negative for myalgias.   Skin: Negative for pallor.   Neurological: Positive for light-headedness. Negative for numbness.       Objective   /76 (BP Location:  "Right arm, Patient Position: Sitting, Cuff Size: Adult)   Pulse 72   Temp 97.8 °F (36.6 °C) (Oral)   Resp 16   Ht 182.9 cm (72\")   Wt 92.5 kg (204 lb)   SpO2 96%   BMI 27.67 kg/m²   Physical Exam   Constitutional: He is oriented to person, place, and time. He appears well-developed and well-nourished. He does not have a sickly appearance.   HENT:   Head: Normocephalic and atraumatic.   Right Ear: Hearing and ear canal normal. Tympanic membrane is not bulging.   Left Ear: Hearing, external ear and ear canal normal. Tympanic membrane is bulging. A middle ear effusion is present.   Nose: Mucosal edema, rhinorrhea and congestion present. Right sinus exhibits maxillary sinus tenderness and frontal sinus tenderness. Left sinus exhibits maxillary sinus tenderness and frontal sinus tenderness.   Mouth/Throat: Oropharyngeal exudate and posterior oropharyngeal erythema present. No posterior oropharyngeal edema.   Eyes: Pupils are equal, round, and reactive to light. EOM are normal. Right conjunctiva has no hemorrhage. Left conjunctiva has no hemorrhage.   Neck: Neck supple. No JVD present. No thyromegaly present.   Cardiovascular: Normal rate, regular rhythm, normal heart sounds and intact distal pulses.   No murmur heard.  Pulmonary/Chest: Effort normal and breath sounds normal. No respiratory distress. He has no wheezes. He has no rales. He exhibits no tenderness.   Abdominal: Soft. He exhibits no distension and no mass. There is no tenderness. There is no rebound and no guarding.   Musculoskeletal: Normal range of motion. He exhibits no edema.   Lymphadenopathy:     He has no cervical adenopathy.   Neurological: He is alert and oriented to person, place, and time.   Skin: Skin is warm. No rash noted.   Psychiatric: He has a normal mood and affect. His behavior is normal.         Abstract on 01/09/2020   Component Date Value Ref Range Status   • HM Colonoscopy 12/07/2018 SEE REPORT    Final         Assessment/Plan "   Diagnoses and all orders for this visit:    1. Lumbar degenerative disc disease (Primary)  -     diclofenac (VOLTAREN) 50 MG EC tablet; Take 1 tablet by mouth 2 (Two) Times a Day As Needed (joint pain).  Dispense: 180 tablet; Refill: 3    2. Primary osteoarthritis involving multiple joints    3. JERONIMO on CPAP    4. Mixed hyperlipidemia  -     Lipid Panel  -     Comprehensive Metabolic Panel  -     rosuvastatin (CRESTOR) 5 MG tablet; Take 1 tablet by mouth Daily.  Dispense: 90 tablet; Refill: 3    5. Benign essential hypertension  -     lisinopril (PRINIVIL,ZESTRIL) 5 MG tablet; Take 1 tablet by mouth Daily.  Dispense: 90 tablet; Refill: 3    6. Benign prostatic hyperplasia without lower urinary tract symptoms  Comments:  PSA followed by DR. Elias in July  Orders:  -     tamsulosin (FLOMAX) 0.4 MG capsule 24 hr capsule; Take 1 capsule by mouth Daily.  Dispense: 90 capsule; Refill: 3    7. Acute non-recurrent maxillary sinusitis  -     azithromycin (ZITHROMAX Z-DORIS) 250 MG tablet; Take 2 tablets the first day, then 1 tablet daily for 4 days.  Dispense: 6 tablet; Refill: 0    Refill all current medications.  I am happy with the current level of blood pressure control.  He is tolerating the Crestor without side effects.  We will recheck his lipid panel and see how effective that has been.  He gets his PSA done through urology every July.  Continue that.  He seems to have no problems with the diclofenac.  He has had no instances of abnormal bleeding.  No increased heartburn.  We will therefore recommend he continue all current medications at the present time.  If symptoms of sinus infection do not improve in the next few days, let me know.  Have recommended he also drink plenty of water and add Mucinex 1 pill twice daily for about a week.             Return in about 6 months (around 7/27/2020).    Call with any problems or concerns before next visit

## 2020-01-28 ENCOUNTER — TELEPHONE (OUTPATIENT)
Dept: FAMILY MEDICINE CLINIC | Facility: CLINIC | Age: 70
End: 2020-01-28

## 2020-01-28 LAB
ALBUMIN SERPL-MCNC: 4.7 G/DL (ref 3.8–4.8)
ALBUMIN/GLOB SERPL: 2 {RATIO} (ref 1.2–2.2)
ALP SERPL-CCNC: 104 IU/L (ref 39–117)
ALT SERPL-CCNC: 20 IU/L (ref 0–44)
AST SERPL-CCNC: 18 IU/L (ref 0–40)
BILIRUB SERPL-MCNC: 0.6 MG/DL (ref 0–1.2)
BUN SERPL-MCNC: 13 MG/DL (ref 8–27)
BUN/CREAT SERPL: 13 (ref 10–24)
CALCIUM SERPL-MCNC: 9.8 MG/DL (ref 8.6–10.2)
CHLORIDE SERPL-SCNC: 104 MMOL/L (ref 96–106)
CHOLEST SERPL-MCNC: 140 MG/DL (ref 100–199)
CO2 SERPL-SCNC: 22 MMOL/L (ref 20–29)
CREAT SERPL-MCNC: 0.98 MG/DL (ref 0.76–1.27)
GLOBULIN SER CALC-MCNC: 2.4 G/DL (ref 1.5–4.5)
GLUCOSE SERPL-MCNC: 90 MG/DL (ref 65–99)
HDLC SERPL-MCNC: 39 MG/DL
LDLC SERPL CALC-MCNC: 81 MG/DL (ref 0–99)
POTASSIUM SERPL-SCNC: 4.2 MMOL/L (ref 3.5–5.2)
PROT SERPL-MCNC: 7.1 G/DL (ref 6–8.5)
SODIUM SERPL-SCNC: 142 MMOL/L (ref 134–144)
TRIGL SERPL-MCNC: 101 MG/DL (ref 0–149)
VLDLC SERPL CALC-MCNC: 20 MG/DL (ref 5–40)

## 2020-01-28 NOTE — TELEPHONE ENCOUNTER
Called and s/w patient. Patient's identity verified. Advised him of lab results. Voiced understanding. No concerns noted.

## 2020-01-28 NOTE — TELEPHONE ENCOUNTER
----- Message from Lidia Hirsch MD sent at 1/28/2020 10:59 AM EST -----  Please tell Malvin that his blood work looked excellent.  The Crestor is doing the job well.  Total cholesterol is 140, down from 201.  LDL is now 81, down from 134.  Blood sugar was normal at 90.  Kidney and liver tests are all normal.  Recommend continuing Crestor at 5 mg/day, as well as all his other medicines.  Thanks

## 2020-07-10 ENCOUNTER — LAB (OUTPATIENT)
Dept: FAMILY MEDICINE CLINIC | Facility: CLINIC | Age: 70
End: 2020-07-10

## 2020-07-10 DIAGNOSIS — I10 BENIGN ESSENTIAL HYPERTENSION: ICD-10-CM

## 2020-07-10 DIAGNOSIS — E78.2 MIXED HYPERLIPIDEMIA: Primary | ICD-10-CM

## 2020-07-11 LAB
ALBUMIN SERPL-MCNC: 4.8 G/DL (ref 3.8–4.8)
ALBUMIN/GLOB SERPL: 2.3 {RATIO} (ref 1.2–2.2)
ALP SERPL-CCNC: 74 IU/L (ref 39–117)
ALT SERPL-CCNC: 29 IU/L (ref 0–44)
AST SERPL-CCNC: 21 IU/L (ref 0–40)
BILIRUB SERPL-MCNC: 0.8 MG/DL (ref 0–1.2)
BUN SERPL-MCNC: 15 MG/DL (ref 8–27)
BUN/CREAT SERPL: 14 (ref 10–24)
CALCIUM SERPL-MCNC: 9.3 MG/DL (ref 8.6–10.2)
CHLORIDE SERPL-SCNC: 103 MMOL/L (ref 96–106)
CHOLEST SERPL-MCNC: 136 MG/DL (ref 100–199)
CO2 SERPL-SCNC: 23 MMOL/L (ref 20–29)
CREAT SERPL-MCNC: 1.04 MG/DL (ref 0.76–1.27)
GLOBULIN SER CALC-MCNC: 2.1 G/DL (ref 1.5–4.5)
GLUCOSE SERPL-MCNC: 86 MG/DL (ref 65–99)
HDLC SERPL-MCNC: 39 MG/DL
LDLC SERPL CALC-MCNC: 78 MG/DL (ref 0–99)
POTASSIUM SERPL-SCNC: 4.3 MMOL/L (ref 3.5–5.2)
PROT SERPL-MCNC: 6.9 G/DL (ref 6–8.5)
PSA SERPL-MCNC: 1.4 NG/ML (ref 0–4)
SODIUM SERPL-SCNC: 139 MMOL/L (ref 134–144)
TRIGL SERPL-MCNC: 94 MG/DL (ref 0–149)
VLDLC SERPL CALC-MCNC: 19 MG/DL (ref 5–40)

## 2020-07-27 ENCOUNTER — OFFICE VISIT (OUTPATIENT)
Dept: FAMILY MEDICINE CLINIC | Facility: CLINIC | Age: 70
End: 2020-07-27

## 2020-07-27 VITALS
HEIGHT: 72 IN | DIASTOLIC BLOOD PRESSURE: 71 MMHG | HEART RATE: 71 BPM | OXYGEN SATURATION: 99 % | SYSTOLIC BLOOD PRESSURE: 111 MMHG | WEIGHT: 195.2 LBS | TEMPERATURE: 97.8 F | BODY MASS INDEX: 26.44 KG/M2

## 2020-07-27 DIAGNOSIS — I10 BENIGN ESSENTIAL HYPERTENSION: Primary | ICD-10-CM

## 2020-07-27 DIAGNOSIS — G47.33 OSA ON CPAP: ICD-10-CM

## 2020-07-27 DIAGNOSIS — E78.2 MIXED HYPERLIPIDEMIA: ICD-10-CM

## 2020-07-27 DIAGNOSIS — Z99.89 OSA ON CPAP: ICD-10-CM

## 2020-07-27 DIAGNOSIS — R73.01 FASTING HYPERGLYCEMIA: ICD-10-CM

## 2020-07-27 DIAGNOSIS — Q67.7 CONGENITAL PECTUS CARINATUM: ICD-10-CM

## 2020-07-27 DIAGNOSIS — Z13.6 ENCOUNTER FOR ABDOMINAL AORTIC ANEURYSM (AAA) SCREENING: ICD-10-CM

## 2020-07-27 DIAGNOSIS — Z87.891 FORMER SMOKER: ICD-10-CM

## 2020-07-27 DIAGNOSIS — R09.89 OTHER SPECIFIED SYMPTOMS AND SIGNS INVOLVING THE CIRCULATORY AND RESPIRATORY SYSTEMS: ICD-10-CM

## 2020-07-27 PROCEDURE — 99214 OFFICE O/P EST MOD 30 MIN: CPT | Performed by: FAMILY MEDICINE

## 2020-07-27 NOTE — PROGRESS NOTES
"Subjective   Malvin Bustillo is a 70 y.o. male.   Chief Complaint   Patient presents with   • Hypertension       History of Present Illness   Patient is here for a 6 month f/u on blood pressure. Patient brought with him home logs.  I reviewed his blood pressure log and indeed looks like the majority of his blood pressures are excellent.  There is a high systolic of 129 and a low of 98.  Diastolics range from 58-75.  Pulse is typically in the 70s.  Tolerating medications without side effect.    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 Patient claims of pain on left side of chest more so lately.     Last labs were 2 weeks ago.  PSA was 1.4.  CMP was normal.  Lipid panel was excellent.    Hyperlipidemia-tolerating Crestor without side effects.    JERONIMO-continues with CPAP.    He brings a vascular screening flyer in.  He asks if he should have this done.  I recommended against it.  He has never had a AAA screen done.  He has had episodes in the past where he has had transient episodes of dizziness and lightheadedness.  He is a former smoker.       Patient Active Problem List    Diagnosis Date Noted   • JERONIMO on CPAP 09/26/2019     Note Last Updated: 9/26/2019     Dr. Nur     • Lumbar degenerative disc disease 08/06/2019     Note Last Updated: 8/6/2019     Imaged on abd series 2018     • Mixed hyperlipidemia 08/05/2019   • Sleep apnea 08/05/2019   • Constipation 10/01/2018   • Other dorsalgia 06/14/2018   • Benign prostatic hyperplasia 04/02/2018   • Degenerative joint disease of left knee 01/11/2018   • Benign essential hypertension 12/11/2017   • Knee pain, left 12/11/2017   • Seborrheic keratosis 12/11/2017   • Recurrent urinary tract infection 10/10/2017   • Adenomatous polyp of colon 06/20/2016   • Body mass index (BMI) of 25.0 to 29.9 12/21/2015   • Intermittent vertigo 12/05/2014   • Fasting hyperglycemia 12/18/2013   • Degenerative joint " disease 01/01/1960           Past Surgical History:   Procedure Laterality Date   • COLONOSCOPY  2013 2013= TA/ 2018= NEG, Rech 2023   • HEMORRHOIDECTOMY     • INGUINAL HERNIA REPAIR Left    • ORCHIECTOMY Left     secondary due to hernia incarceration   • TONSILLECTOMY AND ADENOIDECTOMY       Current Outpatient Medications on File Prior to Visit   Medication Sig   • aspirin (ASPIRIN ADULT LOW DOSE) 81 MG EC tablet Take 1 tablet by mouth Daily.   • BENZOYL PEROXIDE 10 % external wash Apply 1 application topically to the appropriate area as directed Daily.   • cholecalciferol (VITAMIN D3) 1000 units tablet Take 1 tablet by mouth Daily.   • clindamycin (CLEOCIN T) 1 % external solution Apply 1 application topically to the appropriate area as directed Daily.   • diclofenac (VOLTAREN) 50 MG EC tablet Take 1 tablet by mouth 2 (Two) Times a Day As Needed (joint pain).   • docusate sodium (COLACE) 100 MG capsule Take 1 capsule by mouth 2 (Two) Times a Day.   • Fish Oil-Cholecalciferol (FISH OIL + D3 PO) Take 2 capsules by mouth Daily.   • L-Lysine 500 MG tablet tablet Take 1 tablet by mouth Daily.   • lisinopril (PRINIVIL,ZESTRIL) 5 MG tablet Take 1 tablet by mouth Daily.   • MULTIPLE VITAMIN PO Take 1 tablet by mouth Daily.   • rosuvastatin (CRESTOR) 5 MG tablet Take 1 tablet by mouth Daily.   • tamsulosin (FLOMAX) 0.4 MG capsule 24 hr capsule Take 1 capsule by mouth Daily.   • Zinc 50 MG tablet Take 1 tablet by mouth Daily.   • [DISCONTINUED] azithromycin (ZITHROMAX Z-DORIS) 250 MG tablet Take 2 tablets the first day, then 1 tablet daily for 4 days.     No current facility-administered medications on file prior to visit.      Allergies   Allergen Reactions   • Lipitor [Atorvastatin Calcium] Myalgia     Social History     Socioeconomic History   • Marital status:      Spouse name: Not on file   • Number of children: Not on file   • Years of education: Not on file   • Highest education level: Not on file   Tobacco  "Use   • Smoking status: Former Smoker     Packs/day: 2.00     Years: 10.00     Pack years: 20.00     Types: Cigarettes     Start date:      Last attempt to quit:      Years since quittin.5   • Smokeless tobacco: Never Used   Substance and Sexual Activity   • Alcohol use: Yes     Alcohol/week: 7.0 standard drinks     Types: 7 Cans of beer per week     Frequency: 4 or more times a week   • Drug use: No     Family History   Problem Relation Age of Onset   • Hypertension Mother    • Heart disease Mother    • Depression Mother    • Osteoporosis Mother    • Skin cancer Mother    • Thyroid disease Mother    • Coronary artery disease Mother    • Heart disease Father    • Diabetes Father    • Coronary artery disease Father    • Hypertension Brother    • Heart disease Brother    • No Known Problems Sister      The following portions of the patient's history were reviewed and updated as appropriate: allergies, current medications, past family history, past medical history, past social history, past surgical history and problem list.    Review of Systems   Constitutional: Negative for chills and fever.   Respiratory: Negative for cough and shortness of breath.    Cardiovascular: Negative for chest pain.   Gastrointestinal: Negative for abdominal pain.   Neurological: Negative for dizziness and headache.       Objective   /71 (BP Location: Left arm, Patient Position: Sitting, Cuff Size: Adult)   Pulse 71   Temp 97.8 °F (36.6 °C) (Infrared)   Ht 182.9 cm (72.01\")   Wt 88.5 kg (195 lb 3.2 oz)   SpO2 99%   BMI 26.47 kg/m²   Physical Exam   Constitutional: He is oriented to person, place, and time. He appears well-developed and well-nourished.   HENT:   Head: Normocephalic and atraumatic.   Eyes: Conjunctivae and EOM are normal.   Neck: Normal range of motion.   Cardiovascular: Normal rate.   Pulmonary/Chest: Effort normal. He exhibits deformity.   There is some asymmetry of his sternum.  Seems to be " sticking out a little bit more on the left than the right.  There is tenderness to palpation over the sterno-clavicular joints.  There is no cavus deformity.  This is effectively minor pectus carinatum.   Musculoskeletal: Normal range of motion.   Neurological: He is alert and oriented to person, place, and time.   Skin: Skin is warm and dry. No rash noted.   Psychiatric: He has a normal mood and affect. His behavior is normal.         Lab on 07/10/2020   Component Date Value Ref Range Status   • Total Cholesterol 07/10/2020 136  100 - 199 mg/dL Final   • Triglycerides 07/10/2020 94  0 - 149 mg/dL Final   • HDL Cholesterol 07/10/2020 39* >39 mg/dL Final   • VLDL Cholesterol 07/10/2020 19  5 - 40 mg/dL Final   • LDL Cholesterol  07/10/2020 78  0 - 99 mg/dL Final   • Glucose 07/10/2020 86  65 - 99 mg/dL Final   • BUN 07/10/2020 15  8 - 27 mg/dL Final   • Creatinine 07/10/2020 1.04  0.76 - 1.27 mg/dL Final   • eGFR Non African Am 07/10/2020 73  >59 mL/min/1.73 Final   • eGFR African Am 07/10/2020 85  >59 mL/min/1.73 Final   • BUN/Creatinine Ratio 07/10/2020 14  10 - 24 Final   • Sodium 07/10/2020 139  134 - 144 mmol/L Final   • Potassium 07/10/2020 4.3  3.5 - 5.2 mmol/L Final   • Chloride 07/10/2020 103  96 - 106 mmol/L Final   • Total CO2 07/10/2020 23  20 - 29 mmol/L Final   • Calcium 07/10/2020 9.3  8.6 - 10.2 mg/dL Final   • Total Protein 07/10/2020 6.9  6.0 - 8.5 g/dL Final   • Albumin 07/10/2020 4.8  3.8 - 4.8 g/dL Final   • Globulin 07/10/2020 2.1  1.5 - 4.5 g/dL Final   • A/G Ratio 07/10/2020 2.3* 1.2 - 2.2 Final   • Total Bilirubin 07/10/2020 0.8  0.0 - 1.2 mg/dL Final   • Alkaline Phosphatase 07/10/2020 74  39 - 117 IU/L Final   • AST (SGOT) 07/10/2020 21  0 - 40 IU/L Final   • ALT (SGPT) 07/10/2020 29  0 - 44 IU/L Final   • PSA 07/10/2020 1.4  0.0 - 4.0 ng/mL Final    Comment: Roche ECLIA methodology.  According to the American U[employer] Association, Serum PSA should  decrease and remain at undetectable  levels after radical  prostatectomy. The AUA defines biochemical recurrence as an initial  PSA value 0.2 ng/mL or greater followed by a subsequent confirmatory  PSA value 0.2 ng/mL or greater.  Values obtained with different assay methods or kits cannot be used  interchangeably. Results cannot be interpreted as absolute evidence  of the presence or absence of malignant disease.           Assessment/Plan   Diagnoses and all orders for this visit:    1. Benign essential hypertension (Primary)  -     Duplex Carotid Ultrasound CAR; Future    2. Mixed hyperlipidemia  -     Duplex Carotid Ultrasound CAR; Future    3. JERONIMO on CPAP    4. Fasting hyperglycemia    5. Congenital pectus carinatum  -     XR Chest PA & Lateral (In Office)  -     XR Sternum PA & Lateral    6. Encounter for abdominal aortic aneurysm (AAA) screening  -     Abdominal Aortic Aneurysm Screening Medicare CAR; Future    7. Former smoker  -     Duplex Carotid Ultrasound CAR; Future    8. Other specified symptoms and signs involving the circulatory and respiratory systems   -     Duplex Carotid Ultrasound CAR; Future    All of his labs look very good today.  He is up-to-date on screening colonoscopy.  That will be due again in 2023.  Continue all current medications at present doses.   I recommended we do formal vascular screening with an abdominal aortic ultrasound and carotid Doppler ultrasounds.  We will follow-up with him when those results are available.    We will do a chest x-ray and sternal x-ray here in the office today to evaluate for major abnormalities of his sternum and chest wall.  Reassured about the benign nature of pectus carinatum.  At this point I doubt he is a candidate for any type of surgical procedure.  I do expect that he will get some discomfort from time to time because of the traction of the abdominal muscle insertions on the ribs.  Have advised some stretching, heat.    Plan on following up again in 6 months.         Return in  about 6 months (around 1/27/2021).    Call with any problems or concerns before next visit

## 2020-08-11 ENCOUNTER — TELEPHONE (OUTPATIENT)
Dept: FAMILY MEDICINE CLINIC | Facility: CLINIC | Age: 70
End: 2020-08-11

## 2020-08-11 DIAGNOSIS — Z13.6 ENCOUNTER FOR ABDOMINAL AORTIC ANEURYSM (AAA) SCREENING: Primary | ICD-10-CM

## 2020-08-11 NOTE — TELEPHONE ENCOUNTER
----- Message from Елена Gore sent at 8/11/2020  2:25 PM EDT -----  THE ORDER FOR THE ABDOMINAL AORTIC ANEURYSM SCREENING MEDICARE CAR IS THE INCORRECT ORDER. FOR MEDICARE SCREENING, IT HAS TO BE US AAA.  PLEASE CORRECT ORDER.

## 2020-08-18 ENCOUNTER — HOSPITAL ENCOUNTER (OUTPATIENT)
Dept: ULTRASOUND IMAGING | Facility: HOSPITAL | Age: 70
Discharge: HOME | End: 2020-08-18

## 2020-08-18 ENCOUNTER — HOSPITAL ENCOUNTER (OUTPATIENT)
Dept: CARDIOLOGY | Facility: HOSPITAL | Age: 70
Discharge: HOME | End: 2020-08-18
Admitting: FAMILY MEDICINE

## 2020-08-18 DIAGNOSIS — R09.89 OTHER SPECIFIED SYMPTOMS AND SIGNS INVOLVING THE CIRCULATORY AND RESPIRATORY SYSTEMS: ICD-10-CM

## 2020-08-18 DIAGNOSIS — I10 BENIGN ESSENTIAL HYPERTENSION: ICD-10-CM

## 2020-08-18 DIAGNOSIS — Z87.891 FORMER SMOKER: ICD-10-CM

## 2020-08-18 DIAGNOSIS — Z13.6 ENCOUNTER FOR ABDOMINAL AORTIC ANEURYSM (AAA) SCREENING: ICD-10-CM

## 2020-08-18 DIAGNOSIS — E78.2 MIXED HYPERLIPIDEMIA: ICD-10-CM

## 2020-08-18 LAB
BH CV XLRA MEAS LEFT CCA RATIO VEL: 94.4 CM/SEC
BH CV XLRA MEAS LEFT DIST CCA EDV: 16.5 CM/SEC
BH CV XLRA MEAS LEFT DIST CCA PSV: 76.3 CM/SEC
BH CV XLRA MEAS LEFT DIST ICA EDV: -28.7 CM/SEC
BH CV XLRA MEAS LEFT DIST ICA PSV: -65.9 CM/SEC
BH CV XLRA MEAS LEFT ICA RATIO VEL: -82 CM/SEC
BH CV XLRA MEAS LEFT ICA/CCA RATIO: -0.87
BH CV XLRA MEAS LEFT PROX CCA EDV: 23.6 CM/SEC
BH CV XLRA MEAS LEFT PROX CCA PSV: 94.4 CM/SEC
BH CV XLRA MEAS LEFT PROX ECA PSV: -117 CM/SEC
BH CV XLRA MEAS LEFT PROX ICA EDV: -30.8 CM/SEC
BH CV XLRA MEAS LEFT PROX ICA PSV: -82 CM/SEC
BH CV XLRA MEAS LEFT PROX SCLA PSV: 139 CM/SEC
BH CV XLRA MEAS LEFT VERTEBRAL A PSV: -46.9 CM/SEC
BH CV XLRA MEAS RIGHT CCA RATIO VEL: 95.1 CM/SEC
BH CV XLRA MEAS RIGHT DIST CCA EDV: 18 CM/SEC
BH CV XLRA MEAS RIGHT DIST CCA PSV: 82 CM/SEC
BH CV XLRA MEAS RIGHT DIST ICA EDV: -30.4 CM/SEC
BH CV XLRA MEAS RIGHT DIST ICA PSV: -91.9 CM/SEC
BH CV XLRA MEAS RIGHT ICA RATIO VEL: -106 CM/SEC
BH CV XLRA MEAS RIGHT ICA/CCA RATIO: -1.1
BH CV XLRA MEAS RIGHT PROX CCA EDV: 20.5 CM/SEC
BH CV XLRA MEAS RIGHT PROX CCA PSV: 95.1 CM/SEC
BH CV XLRA MEAS RIGHT PROX ECA PSV: -110 CM/SEC
BH CV XLRA MEAS RIGHT PROX ICA EDV: -32.9 CM/SEC
BH CV XLRA MEAS RIGHT PROX ICA PSV: -106 CM/SEC
BH CV XLRA MEAS RIGHT PROX SCLA PSV: 130 CM/SEC
BH CV XLRA MEAS RIGHT VERTEBRAL A PSV: -60.3 CM/SEC

## 2020-08-18 PROCEDURE — 76706 US ABDL AORTA SCREEN AAA: CPT

## 2020-08-18 PROCEDURE — 93880 EXTRACRANIAL BILAT STUDY: CPT

## 2020-08-19 ENCOUNTER — TELEPHONE (OUTPATIENT)
Dept: FAMILY MEDICINE CLINIC | Facility: CLINIC | Age: 70
End: 2020-08-19

## 2020-09-30 ENCOUNTER — CLINICAL SUPPORT (OUTPATIENT)
Dept: FAMILY MEDICINE CLINIC | Facility: CLINIC | Age: 70
End: 2020-09-30

## 2020-09-30 DIAGNOSIS — Z23 FLU VACCINE NEED: Primary | ICD-10-CM

## 2020-09-30 PROCEDURE — 90694 VACC AIIV4 NO PRSRV 0.5ML IM: CPT | Performed by: FAMILY MEDICINE

## 2020-09-30 PROCEDURE — G0008 ADMIN INFLUENZA VIRUS VAC: HCPCS | Performed by: FAMILY MEDICINE

## 2020-12-14 ENCOUNTER — OFFICE VISIT (OUTPATIENT)
Dept: FAMILY MEDICINE CLINIC | Facility: CLINIC | Age: 70
End: 2020-12-14

## 2020-12-14 VITALS
SYSTOLIC BLOOD PRESSURE: 102 MMHG | HEART RATE: 80 BPM | WEIGHT: 192.8 LBS | BODY MASS INDEX: 26.11 KG/M2 | OXYGEN SATURATION: 96 % | HEIGHT: 72 IN | DIASTOLIC BLOOD PRESSURE: 63 MMHG | TEMPERATURE: 99.3 F

## 2020-12-14 DIAGNOSIS — R68.83 CHILLS: ICD-10-CM

## 2020-12-14 DIAGNOSIS — R05.9 COUGH: Primary | ICD-10-CM

## 2020-12-14 DIAGNOSIS — R50.81 FEVER IN OTHER DISEASES: ICD-10-CM

## 2020-12-14 LAB
EXPIRATION DATE: NORMAL
FLUAV AG NPH QL: NEGATIVE
FLUBV AG NPH QL: NEGATIVE
INTERNAL CONTROL: NORMAL
Lab: NORMAL

## 2020-12-14 PROCEDURE — 99214 OFFICE O/P EST MOD 30 MIN: CPT | Performed by: FAMILY MEDICINE

## 2020-12-14 PROCEDURE — 87804 INFLUENZA ASSAY W/OPTIC: CPT | Performed by: FAMILY MEDICINE

## 2020-12-14 NOTE — PROGRESS NOTES
Subjective   Malvin Bustillo is a 70 y.o. male.   No chief complaint on file.      History of Present Illness   Patient here today for body aches, chills, low grade fever of 99.3, headache, sweats  He states he's had no known COVID exposure. Swab for COVID and flu done.  Above reviewed and verified.  He tells me that he had fairly sudden onset yesterday afternoon of malaise, body aches and shaking chills.  He became very weak and tired.  Had a low-grade fever of 99.4 degrees.  He went to bed.  He had a bit of a dry cough, but that resolved.  He had very bad shaking chills and sweats last night.  Today he feels tired and achy.  Very fatigued.  No cough today yet.  He denies any abdominal pain or diarrhea.  No chest pain, pressure, dyspnea.    He has had no known exposures to Covid.  He tells me he only goes to the doctor's office, here, the grocery store.  He has not seen his children since February.      Patient Active Problem List    Diagnosis Date Noted   • JERONIMO on CPAP 09/26/2019     Note Last Updated: 9/26/2019     Dr. Nur     • Lumbar degenerative disc disease 08/06/2019     Note Last Updated: 8/6/2019     Imaged on abd series 2018     • Mixed hyperlipidemia 08/05/2019   • Sleep apnea 08/05/2019   • Constipation 10/01/2018   • Other dorsalgia 06/14/2018   • Benign prostatic hyperplasia 04/02/2018   • Degenerative joint disease of left knee 01/11/2018   • Benign essential hypertension 12/11/2017   • Knee pain, left 12/11/2017   • Seborrheic keratosis 12/11/2017   • Recurrent urinary tract infection 10/10/2017   • Adenomatous polyp of colon 06/20/2016   • Body mass index (BMI) of 25.0 to 29.9 12/21/2015   • Intermittent vertigo 12/05/2014   • Fasting hyperglycemia 12/18/2013   • Degenerative joint disease 01/01/1960           Past Surgical History:   Procedure Laterality Date   • COLONOSCOPY  2013 2013= TA/ 2018= NEG, Rech 2023   • HEMORRHOIDECTOMY     • INGUINAL HERNIA REPAIR Left    • ORCHIECTOMY Left      secondary due to hernia incarceration   • TONSILLECTOMY AND ADENOIDECTOMY       Current Outpatient Medications on File Prior to Visit   Medication Sig   • aspirin (ASPIRIN ADULT LOW DOSE) 81 MG EC tablet Take 1 tablet by mouth Daily.   • BENZOYL PEROXIDE 10 % external wash Apply 1 application topically to the appropriate area as directed Daily.   • cholecalciferol (VITAMIN D3) 1000 units tablet Take 1 tablet by mouth Daily.   • clindamycin (CLEOCIN T) 1 % external solution Apply 1 application topically to the appropriate area as directed Daily.   • diclofenac (VOLTAREN) 50 MG EC tablet Take 1 tablet by mouth 2 (Two) Times a Day As Needed (joint pain).   • docusate sodium (COLACE) 100 MG capsule Take 1 capsule by mouth 2 (Two) Times a Day.   • Fish Oil-Cholecalciferol (FISH OIL + D3 PO) Take 2 capsules by mouth Daily.   • L-Lysine 500 MG tablet tablet Take 1 tablet by mouth Daily.   • lisinopril (PRINIVIL,ZESTRIL) 5 MG tablet Take 1 tablet by mouth Daily.   • MULTIPLE VITAMIN PO Take 1 tablet by mouth Daily.   • rosuvastatin (CRESTOR) 5 MG tablet Take 1 tablet by mouth Daily.   • tamsulosin (FLOMAX) 0.4 MG capsule 24 hr capsule Take 1 capsule by mouth Daily.   • Zinc 50 MG tablet Take 1 tablet by mouth Daily.     No current facility-administered medications on file prior to visit.      Allergies   Allergen Reactions   • Lipitor [Atorvastatin Calcium] Myalgia     Social History     Socioeconomic History   • Marital status:      Spouse name: Not on file   • Number of children: Not on file   • Years of education: Not on file   • Highest education level: Not on file   Tobacco Use   • Smoking status: Former Smoker     Packs/day: 2.00     Years: 10.00     Pack years: 20.00     Types: Cigarettes     Start date:      Quit date:      Years since quittin.9   • Smokeless tobacco: Never Used   Substance and Sexual Activity   • Alcohol use: Yes     Alcohol/week: 7.0 standard drinks     Types: 7 Cans of beer  "per week     Frequency: 4 or more times a week   • Drug use: No     Family History   Problem Relation Age of Onset   • Hypertension Mother    • Heart disease Mother    • Depression Mother    • Osteoporosis Mother    • Skin cancer Mother    • Thyroid disease Mother    • Coronary artery disease Mother    • Heart disease Father    • Diabetes Father    • Coronary artery disease Father    • Hypertension Brother    • Heart disease Brother    • No Known Problems Sister      The following portions of the patient's history were reviewed and updated as appropriate: allergies, current medications, past family history, past medical history, past social history, past surgical history and problem list.    Review of Systems   Constitutional: Positive for appetite change (poor appetite), chills, fatigue and fever.   Respiratory: Positive for cough. Negative for chest tightness and shortness of breath.    Cardiovascular: Negative for chest pain and leg swelling.   Gastrointestinal: Negative for abdominal pain, diarrhea, nausea and vomiting.   Neurological: Negative for seizures.       Objective   /63 (BP Location: Left arm, Patient Position: Sitting, Cuff Size: Adult)   Pulse 80   Temp 99.3 °F (37.4 °C) (Infrared)   Ht 182.9 cm (72.01\")   Wt 87.5 kg (192 lb 12.8 oz)   SpO2 96%   BMI 26.14 kg/m²   Physical Exam  Constitutional:       General: He is not in acute distress.     Appearance: He is well-developed.      Comments: Wearing a face mask  Looks just a little peaked   HENT:      Head: Normocephalic and atraumatic.   Eyes:      Conjunctiva/sclera: Conjunctivae normal.   Neck:      Musculoskeletal: Normal range of motion.   Cardiovascular:      Rate and Rhythm: Normal rate and regular rhythm.      Heart sounds: No murmur.   Pulmonary:      Effort: Pulmonary effort is normal. No respiratory distress.      Breath sounds: Normal breath sounds.      Comments: No cough even to deep breathing.  Musculoskeletal: Normal range " of motion.   Skin:     General: Skin is warm and dry.      Coloration: Skin is pale.      Findings: No rash.   Neurological:      Mental Status: He is alert and oriented to person, place, and time.   Psychiatric:         Behavior: Behavior normal.       Influenza a and B testing in the office today was negative.  Assessment/Plan   Diagnoses and all orders for this visit:    1. Cough (Primary)  -     POC Influenza A / B  -     COVID-19,[facility] ROUTINE, NP/OP SWAB IN TRANSPORT MEDIA OR ESWAB 72 HR TAT - Swab, Nasopharynx    2. Fever in other diseases    3. Chills    He should consider himself contagious for coronavirus.  He is already starting to isolate in his master bedroom at home.  I have recommended he continue to do that.  Make sure to keep up plenty of fluid intake.  Treat the fevers and chills with Tylenol.  Rest.  Make sure he drinks lots of fluids.  I reiterated that twice.  Treat other symptoms that arise such as cough, congestion.  We will follow-up with him when the results of his Covid test are available.  We will give further recommendations at that point.  May need to reevaluate him in a few days if symptoms do not begin to improve.             No follow-ups on file.    Call with any problems or concerns before next visit

## 2020-12-15 LAB — SARS-COV-2 RNA RESP QL NAA+PROBE: NOT DETECTED

## 2021-01-15 DIAGNOSIS — M51.36 LUMBAR DEGENERATIVE DISC DISEASE: ICD-10-CM

## 2021-01-27 ENCOUNTER — OFFICE VISIT (OUTPATIENT)
Dept: FAMILY MEDICINE CLINIC | Facility: CLINIC | Age: 71
End: 2021-01-27

## 2021-01-27 VITALS
OXYGEN SATURATION: 100 % | TEMPERATURE: 97.8 F | SYSTOLIC BLOOD PRESSURE: 107 MMHG | HEIGHT: 72 IN | DIASTOLIC BLOOD PRESSURE: 70 MMHG | BODY MASS INDEX: 26.66 KG/M2 | HEART RATE: 70 BPM | WEIGHT: 196.8 LBS

## 2021-01-27 DIAGNOSIS — I10 BENIGN ESSENTIAL HYPERTENSION: ICD-10-CM

## 2021-01-27 DIAGNOSIS — K59.09 CHRONIC CONSTIPATION: Primary | ICD-10-CM

## 2021-01-27 DIAGNOSIS — E78.2 MIXED HYPERLIPIDEMIA: ICD-10-CM

## 2021-01-27 DIAGNOSIS — Z12.5 ENCOUNTER FOR PROSTATE CANCER SCREENING: ICD-10-CM

## 2021-01-27 PROCEDURE — 99214 OFFICE O/P EST MOD 30 MIN: CPT | Performed by: FAMILY MEDICINE

## 2021-01-27 NOTE — PROGRESS NOTES
Subjective   Malvin Bustillo is a 70 y.o. male.   Chief Complaint   Patient presents with   • Constipation       History of Present Illness   Patient is here for a f/u on his chest and sternal area.  No complaints. States that when he does have pain he changes positions and it does not hurt. Patient is complaining of constipation. States he does 3 stool softeners a day and fiber powder daily and only has a good bowel movement once every two weeks.  Above reviewed and verified.  Constipation - for years.  Has used OTC Benefiber, colace.  Typically will have a good bowel movement every 2 weeks.  Very hard, has to strain a lot.  No blood in the bowel movements.    HTN - B/P log reviewed  - averaging - 120 SBP.  No side effects of medications.  No swelling in his feet.    No other complaints today.       Patient Active Problem List    Diagnosis Date Noted   • JERONIMO on CPAP 09/26/2019     Note Last Updated: 9/26/2019     Dr. Nur     • Lumbar degenerative disc disease 08/06/2019     Note Last Updated: 8/6/2019     Imaged on abd series 2018     • Mixed hyperlipidemia 08/05/2019   • Sleep apnea 08/05/2019   • Constipation 10/01/2018   • Other dorsalgia 06/14/2018   • Benign prostatic hyperplasia 04/02/2018   • Degenerative joint disease of left knee 01/11/2018   • Benign essential hypertension 12/11/2017   • Knee pain, left 12/11/2017   • Seborrheic keratosis 12/11/2017   • Recurrent urinary tract infection 10/10/2017   • Adenomatous polyp of colon 06/20/2016   • Body mass index (BMI) of 25.0 to 29.9 12/21/2015   • Intermittent vertigo 12/05/2014   • Fasting hyperglycemia 12/18/2013   • Degenerative joint disease 01/01/1960           Past Surgical History:   Procedure Laterality Date   • COLONOSCOPY  2013 2013= TA/ 2018= NEG, Rech 2023   • HEMORRHOIDECTOMY     • INGUINAL HERNIA REPAIR Left    • ORCHIECTOMY Left     secondary due to hernia incarceration   • TONSILLECTOMY AND ADENOIDECTOMY       Current Outpatient Medications  on File Prior to Visit   Medication Sig   • aspirin (ASPIRIN ADULT LOW DOSE) 81 MG EC tablet Take 1 tablet by mouth Daily.   • BENZOYL PEROXIDE 10 % external wash Apply 1 application topically to the appropriate area as directed Daily.   • cholecalciferol (VITAMIN D3) 1000 units tablet Take 1 tablet by mouth Daily.   • clindamycin (CLEOCIN T) 1 % external solution Apply 1 application topically to the appropriate area as directed Daily.   • diclofenac (VOLTAREN) 50 MG EC tablet TAKE 1 TABLET BY MOUTH TWICE DAILY AS NEEDED (JOINT  PAIN)   • docusate sodium (COLACE) 100 MG capsule Take 1 capsule by mouth 2 (Two) Times a Day.   • Fish Oil-Cholecalciferol (FISH OIL + D3 PO) Take 2 capsules by mouth Daily.   • L-Lysine 500 MG tablet tablet Take 1 tablet by mouth Daily.   • lisinopril (PRINIVIL,ZESTRIL) 5 MG tablet Take 1 tablet by mouth Daily.   • methylcellulose oral powder Take  by mouth Daily.   • MULTIPLE VITAMIN PO Take 1 tablet by mouth Daily.   • rosuvastatin (CRESTOR) 5 MG tablet Take 1 tablet by mouth Daily.   • tamsulosin (FLOMAX) 0.4 MG capsule 24 hr capsule Take 1 capsule by mouth Daily.   • Wheat Dextrin (EQ FIBER POWDER PO) Take  by mouth.   • Zinc 50 MG tablet Take 1 tablet by mouth Daily.     No current facility-administered medications on file prior to visit.      Allergies   Allergen Reactions   • Lipitor [Atorvastatin Calcium] Myalgia     Social History     Socioeconomic History   • Marital status:      Spouse name: Not on file   • Number of children: Not on file   • Years of education: Not on file   • Highest education level: Not on file   Tobacco Use   • Smoking status: Former Smoker     Packs/day: 2.00     Years: 10.00     Pack years: 20.00     Types: Cigarettes     Start date:      Quit date:      Years since quittin.0   • Smokeless tobacco: Never Used   Substance and Sexual Activity   • Alcohol use: Yes     Alcohol/week: 7.0 standard drinks     Types: 7 Cans of beer per week      "Frequency: 4 or more times a week   • Drug use: No     Family History   Problem Relation Age of Onset   • Hypertension Mother    • Heart disease Mother    • Depression Mother    • Osteoporosis Mother    • Skin cancer Mother    • Thyroid disease Mother    • Coronary artery disease Mother    • Heart disease Father    • Diabetes Father    • Coronary artery disease Father    • Hypertension Brother    • Heart disease Brother    • No Known Problems Sister        Review of Systems    Objective   /70 (BP Location: Right arm, Patient Position: Sitting, Cuff Size: Adult)   Pulse 70   Temp 97.8 °F (36.6 °C) (Infrared)   Ht 182.9 cm (72.01\")   Wt 89.3 kg (196 lb 12.8 oz)   SpO2 100%   BMI 26.68 kg/m²   Physical Exam  Constitutional:       Appearance: He is well-developed.      Comments: Wearing a face mask     HENT:      Head: Normocephalic and atraumatic.   Eyes:      Conjunctiva/sclera: Conjunctivae normal.   Neck:      Musculoskeletal: Normal range of motion.   Cardiovascular:      Rate and Rhythm: Normal rate.   Pulmonary:      Effort: Pulmonary effort is normal.   Musculoskeletal: Normal range of motion.   Skin:     General: Skin is warm and dry.      Findings: No rash.   Neurological:      Mental Status: He is alert and oriented to person, place, and time.   Psychiatric:         Behavior: Behavior normal.                 Assessment/Plan   Diagnoses and all orders for this visit:    1. Chronic constipation (Primary)  -     linaclotide (Linzess) 145 MCG capsule capsule; Take 1 capsule by mouth Every Morning Before Breakfast.  Dispense: 30 capsule; Refill: 5    2. Benign essential hypertension  -     Comprehensive Metabolic Panel; Future  -     CBC & Differential; Future    3. Encounter for prostate cancer screening  -     PSA Screen; Future    4. Mixed hyperlipidemia  -     Lipid Panel; Future    Stop taking any Colace.  Start magnesium citrate tonight.  Drink half a bottle late afternoon, drink the second half " of the bottle before bed.  Expect a good bowel movement tomorrow.  Start Linzess 1 pill/day on Friday.  Let me know if this is not effective in getting his bowel movements at least every 2 to 3 days.  Continue all current antihypertensives.  Blood pressures well controlled.  Follow-up in 6 months with labs a few days before.  He also sees a urologist.  We will be ordering the PSA to be done a few days before that visit as well.             Return in about 6 months (around 7/27/2021).    Call with any problems or concerns before next visit

## 2021-01-31 DIAGNOSIS — N40.0 BENIGN PROSTATIC HYPERPLASIA WITHOUT LOWER URINARY TRACT SYMPTOMS: ICD-10-CM

## 2021-02-01 RX ORDER — TAMSULOSIN HYDROCHLORIDE 0.4 MG/1
CAPSULE ORAL
Qty: 90 CAPSULE | Refills: 3 | Status: SHIPPED | OUTPATIENT
Start: 2021-02-01 | End: 2022-01-28 | Stop reason: SDUPTHER

## 2021-02-12 ENCOUNTER — TELEPHONE (OUTPATIENT)
Dept: FAMILY MEDICINE CLINIC | Facility: CLINIC | Age: 71
End: 2021-02-12

## 2021-02-12 NOTE — TELEPHONE ENCOUNTER
Caller: Malvin Bustillo    Relationship: Self    Best call back number: [phone]    What medications are you currently taking:   Current Outpatient Medications on File Prior to Visit   Medication Sig Dispense Refill   • aspirin (ASPIRIN ADULT LOW DOSE) 81 MG EC tablet Take 1 tablet by mouth Daily.     • BENZOYL PEROXIDE 10 % external wash Apply 1 application topically to the appropriate area as directed Daily.  5   • cholecalciferol (VITAMIN D3) 1000 units tablet Take 1 tablet by mouth Daily.     • clindamycin (CLEOCIN T) 1 % external solution Apply 1 application topically to the appropriate area as directed Daily.  5   • diclofenac (VOLTAREN) 50 MG EC tablet TAKE 1 TABLET BY MOUTH TWICE DAILY AS NEEDED (JOINT  PAIN) 180 tablet 1   • docusate sodium (COLACE) 100 MG capsule Take 1 capsule by mouth 2 (Two) Times a Day.     • Fish Oil-Cholecalciferol (FISH OIL + D3 PO) Take 2 capsules by mouth Daily.     • L-Lysine 500 MG tablet tablet Take 1 tablet by mouth Daily.     • linaclotide (Linzess) 145 MCG capsule capsule Take 1 capsule by mouth Every Morning Before Breakfast. 30 capsule 5   • lisinopril (PRINIVIL,ZESTRIL) 5 MG tablet Take 1 tablet by mouth Daily. 90 tablet 3   • methylcellulose oral powder Take  by mouth Daily.     • MULTIPLE VITAMIN PO Take 1 tablet by mouth Daily.     • rosuvastatin (CRESTOR) 5 MG tablet Take 1 tablet by mouth Daily. 90 tablet 3   • tamsulosin (FLOMAX) 0.4 MG capsule 24 hr capsule Take 1 capsule by mouth once daily 90 capsule 3   • Wheat Dextrin (EQ FIBER POWDER PO) Take  by mouth.     • Zinc 50 MG tablet Take 1 tablet by mouth Daily.       No current facility-administered medications on file prior to visit.           Which medication are you concerned about:linaclotide (Linzess) 145 MCG capsule capsule       What are your concerns: PATIENT STATES THE MEDICATION HAS GIVEN HIM DIARRHEA AND HAS COST HIM $453 WITH DEDU[facility]IBLE AND WILL BE OVER$100 GOING FORWARD AND THE PATIENT STATES HE CANT  AFFORD IT. PATIENT STATES THAT HE HAS TWO WEEKS LEFT OF MEDS.

## 2021-02-12 NOTE — TELEPHONE ENCOUNTER
Spoke with patient, he will call his insurance company and see what is cheaper comparable to Linzess and let us know

## 2021-03-01 DIAGNOSIS — E78.2 MIXED HYPERLIPIDEMIA: ICD-10-CM

## 2021-03-01 RX ORDER — ROSUVASTATIN CALCIUM 5 MG/1
TABLET, COATED ORAL
Qty: 90 TABLET | Refills: 3 | Status: SHIPPED | OUTPATIENT
Start: 2021-03-01 | End: 2022-01-28 | Stop reason: SDUPTHER

## 2021-03-23 DIAGNOSIS — I10 BENIGN ESSENTIAL HYPERTENSION: ICD-10-CM

## 2021-03-23 RX ORDER — LISINOPRIL 5 MG/1
TABLET ORAL
Qty: 90 TABLET | Refills: 3 | Status: SHIPPED | OUTPATIENT
Start: 2021-03-23 | End: 2022-01-28 | Stop reason: SDUPTHER

## 2021-07-08 ENCOUNTER — TELEPHONE (OUTPATIENT)
Dept: FAMILY MEDICINE CLINIC | Facility: CLINIC | Age: 71
End: 2021-07-08

## 2021-07-08 NOTE — TELEPHONE ENCOUNTER
PATIENTS WIFE REQUESTS LAB APPOINTMENT ON Friday July 16, 2021 IN THE MORNING. PLEASE ADVISE    CALL BACK #: [phone]

## 2021-07-12 DIAGNOSIS — M51.36 LUMBAR DEGENERATIVE DISC DISEASE: ICD-10-CM

## 2021-07-27 ENCOUNTER — OFFICE VISIT (OUTPATIENT)
Dept: FAMILY MEDICINE CLINIC | Facility: CLINIC | Age: 71
End: 2021-07-27

## 2021-07-27 VITALS
WEIGHT: 189 LBS | BODY MASS INDEX: 25.6 KG/M2 | DIASTOLIC BLOOD PRESSURE: 64 MMHG | SYSTOLIC BLOOD PRESSURE: 118 MMHG | HEART RATE: 68 BPM | OXYGEN SATURATION: 97 % | TEMPERATURE: 97.8 F | HEIGHT: 72 IN

## 2021-07-27 DIAGNOSIS — N40.0 BENIGN PROSTATIC HYPERPLASIA WITHOUT LOWER URINARY TRACT SYMPTOMS: ICD-10-CM

## 2021-07-27 DIAGNOSIS — E78.2 MIXED HYPERLIPIDEMIA: ICD-10-CM

## 2021-07-27 DIAGNOSIS — G47.33 OSA ON CPAP: ICD-10-CM

## 2021-07-27 DIAGNOSIS — M25.532 LEFT WRIST PAIN: ICD-10-CM

## 2021-07-27 DIAGNOSIS — Z99.89 OSA ON CPAP: ICD-10-CM

## 2021-07-27 DIAGNOSIS — I10 BENIGN ESSENTIAL HYPERTENSION: Primary | ICD-10-CM

## 2021-07-27 DIAGNOSIS — K59.04 CHRONIC IDIOPATHIC CONSTIPATION: ICD-10-CM

## 2021-07-27 PROCEDURE — 99214 OFFICE O/P EST MOD 30 MIN: CPT | Performed by: FAMILY MEDICINE

## 2021-07-27 NOTE — PROGRESS NOTES
Subjective   Malvin Bustillo is a 71 y.o. male.   Chief Complaint   Patient presents with   • Wrist Pain   • Hypertension       History of Present Illness   Patient presents today to ask about his left wrist pain, he suspects carpal tunnel, its hurt for a year but progressively getting worse.    Has his BP readings for follow up on his Hypertension.    Got diarrhea on Linzess, asking if he can take winn milk of magnesia.  Above reviewed and verified    He had labs done last week.  I reviewed those with him below.  Everything was great.    HTN-blood pressure remains well controlled.  Blood sugar log reviewed.  His average systolic blood pressure is 118.  Heart rate averages 80.  No side effects of lisinopril.    HLD-lipid panel is at goal.  He is tolerating Crestor without myalgias.  LFTs are not elevated.    BPH-symptoms are well controlled with daily Flomax.  I am sure this also has some effect on his blood pressure as well.    JERONIMO-he tells me that he is currently involved in a recall on his CPAP machine.  He is told that he cannot get a replacement.  He is waiting on that now.  He wants me to know about this because apparently when he was diagnosed with sleep apnea he had abnormalities in his labs.  It sounds like maybe he was polycythemic.  I reassured him his labs are currently normal.    Chronic constipation-Linzess was very expensive and caused diarrhea.  He only took it for 1 month and then stopped it.  He is taking Winn' milk of magnesia tablets and he is having good routine bowel movements now.    Left wrist is sore on the radial side of his wrist.  Comes and goes.      Patient Active Problem List    Diagnosis Date Noted   • JERONIMO on CPAP 09/26/2019     Note Last Updated: 9/26/2019     Dr. Nur     • Lumbar degenerative disc disease 08/06/2019     Note Last Updated: 8/6/2019     Imaged on abd series 2018     • Mixed hyperlipidemia 08/05/2019   • Sleep apnea 08/05/2019   • Constipation 10/01/2018   • Other  dorsalgia 06/14/2018   • Benign prostatic hyperplasia 04/02/2018   • Degenerative joint disease of left knee 01/11/2018   • Benign essential hypertension 12/11/2017   • Knee pain, left 12/11/2017   • Seborrheic keratosis 12/11/2017   • Recurrent urinary tract infection 10/10/2017   • Adenomatous polyp of colon 06/20/2016   • Body mass index (BMI) of 25.0 to 29.9 12/21/2015   • Intermittent vertigo 12/05/2014   • Fasting hyperglycemia 12/18/2013   • Degenerative joint disease 01/01/1960           Past Surgical History:   Procedure Laterality Date   • COLONOSCOPY  2013 2013= TA/ 2018= NEG, Rech 2023   • HEMORRHOIDECTOMY     • INGUINAL HERNIA REPAIR Left    • ORCHIECTOMY Left     secondary due to hernia incarceration   • TONSILLECTOMY AND ADENOIDECTOMY        Current Outpatient Medications on File Prior to Visit   Medication Sig   • aspirin (ASPIRIN ADULT LOW DOSE) 81 MG EC tablet Take 1 tablet by mouth Daily.   • BENZOYL PEROXIDE 10 % external wash Apply 1 application topically to the appropriate area as directed Daily.   • cholecalciferol (VITAMIN D3) 1000 units tablet Take 1 tablet by mouth Daily.   • clindamycin (CLEOCIN T) 1 % external solution Apply 1 application topically to the appropriate area as directed Daily.   • diclofenac (VOLTAREN) 50 MG EC tablet TAKE 1 TABLET BY MOUTH TWICE DAILY AS NEEDED (JOINT  PAIN)   • docusate sodium (COLACE) 100 MG capsule Take 1 capsule by mouth 2 (Two) Times a Day.   • Fish Oil-Cholecalciferol (FISH OIL + D3 PO) Take 2 capsules by mouth Daily.   • L-Lysine 500 MG tablet tablet Take 1 tablet by mouth Daily.   • lisinopril (PRINIVIL,ZESTRIL) 5 MG tablet Take 1 tablet by mouth once daily   • methylcellulose oral powder Take  by mouth Daily.   • MULTIPLE VITAMIN PO Take 1 tablet by mouth Daily.   • rosuvastatin (CRESTOR) 5 MG tablet Take 1 tablet by mouth once daily   • tamsulosin (FLOMAX) 0.4 MG capsule 24 hr capsule Take 1 capsule by mouth once daily   • Wheat Dextrin (EQ  "FIBER POWDER PO) Take  by mouth.   • Zinc 50 MG tablet Take 1 tablet by mouth Daily.   • [DISCONTINUED] linaclotide (Linzess) 145 MCG capsule capsule Take 1 capsule by mouth Every Morning Before Breakfast.     No current facility-administered medications on file prior to visit.     Allergies   Allergen Reactions   • Lipitor [Atorvastatin Calcium] Myalgia     Social History     Socioeconomic History   • Marital status:      Spouse name: Not on file   • Number of children: Not on file   • Years of education: Not on file   • Highest education level: Not on file   Tobacco Use   • Smoking status: Former Smoker     Packs/day: 2.00     Years: 10.00     Pack years: 20.00     Types: Cigarettes     Start date:      Quit date:      Years since quittin.5   • Smokeless tobacco: Never Used   Substance and Sexual Activity   • Alcohol use: Yes     Alcohol/week: 7.0 standard drinks     Types: 7 Cans of beer per week   • Drug use: No     Family History   Problem Relation Age of Onset   • Hypertension Mother    • Heart disease Mother    • Depression Mother    • Osteoporosis Mother    • Skin cancer Mother    • Thyroid disease Mother    • Coronary artery disease Mother    • Heart disease Father    • Diabetes Father    • Coronary artery disease Father    • Hypertension Brother    • Heart disease Brother    • No Known Problems Sister        Review of Systems    Objective   /64 (BP Location: Right arm, Patient Position: Sitting, Cuff Size: Adult)   Pulse 68   Temp 97.8 °F (36.6 °C) (Oral)   Ht 182.9 cm (72.01\")   Wt 85.7 kg (189 lb)   SpO2 97%   BMI 25.63 kg/m²   Physical Exam  Constitutional:       Appearance: He is well-developed.      Comments: Wearing a face mask     HENT:      Head: Normocephalic and atraumatic.   Eyes:      Conjunctiva/sclera: Conjunctivae normal.   Cardiovascular:      Rate and Rhythm: Normal rate.   Pulmonary:      Effort: Pulmonary effort is normal.   Musculoskeletal:         " General: Normal range of motion.      Cervical back: Normal range of motion.      Comments: No visible swelling of the left wrist.  Normal range of motion.   Skin:     General: Skin is warm and dry.      Findings: No rash.   Neurological:      Mental Status: He is alert and oriented to person, place, and time.   Psychiatric:         Behavior: Behavior normal.           Results Encounter on 07/14/2021   Component Date Value Ref Range Status   • Glucose 07/13/2021 91  65 - 99 mg/dL Final   • BUN 07/13/2021 19  8 - 27 mg/dL Final   • Creatinine 07/13/2021 0.91  0.76 - 1.27 mg/dL Final   • eGFR Non  Am 07/13/2021 86  >59 mL/min/1.73 Final   • eGFR African Am 07/13/2021 99  >59 mL/min/1.73 Final    Comment: **[facility] currently reports eGFR in compliance with the current**    recommendations of the National Kidney Foundation. [facility] will    update reporting as new guidelines are published from the NKF-ASN    Task force.     • BUN/Creatinine Ratio 07/13/2021 21  10 - 24 Final   • Sodium 07/13/2021 142  134 - 144 mmol/L Final   • Potassium 07/13/2021 4.3  3.5 - 5.2 mmol/L Final   • Chloride 07/13/2021 106  96 - 106 mmol/L Final   • Total CO2 07/13/2021 23  20 - 29 mmol/L Final   • Calcium 07/13/2021 9.5  8.6 - 10.2 mg/dL Final   • Total Protein 07/13/2021 6.8  6.0 - 8.5 g/dL Final   • Albumin 07/13/2021 4.6  3.8 - 4.8 g/dL Final   • Globulin 07/13/2021 2.2  1.5 - 4.5 g/dL Final   • A/G Ratio 07/13/2021 2.1  1.2 - 2.2 Final   • Total Bilirubin 07/13/2021 0.8  0.0 - 1.2 mg/dL Final   • Alkaline Phosphatase 07/13/2021 90  48 - 121 IU/L Final   • AST (SGOT) 07/13/2021 15  0 - 40 IU/L Final   • ALT (SGPT) 07/13/2021 21  0 - 44 IU/L Final   • Total Cholesterol 07/13/2021 148  100 - 199 mg/dL Final   • Triglycerides 07/13/2021 96  0 - 149 mg/dL Final   • HDL Cholesterol 07/13/2021 40  >39 mg/dL Final   • VLDL Cholesterol Eleno 07/13/2021 18  5 - 40 mg/dL Final   • LDL Chol Calc ([facility]) 07/13/2021 90  0 - 99 mg/dL Final   • WBC  07/13/2021 5.6  3.4 - 10.8 x10E3/uL Final   • RBC 07/13/2021 5.05  4.14 - 5.80 x10E6/uL Final   • Hemoglobin 07/13/2021 15.6  13.0 - 17.7 g/dL Final   • Hematocrit 07/13/2021 46.9  37.5 - 51.0 % Final   • MCV 07/13/2021 93  79 - 97 fL Final   • MCH 07/13/2021 30.9  26.6 - 33.0 pg Final   • MCHC 07/13/2021 33.3  31.5 - 35.7 g/dL Final   • RDW 07/13/2021 12.1  11.6 - 15.4 % Final   • Platelets 07/13/2021 182  150 - 450 x10E3/uL Final   • Neutrophil Rel % 07/13/2021 65  Not Estab. % Final   • Lymphocyte Rel % 07/13/2021 23  Not Estab. % Final   • Monocyte Rel % 07/13/2021 9  Not Estab. % Final   • Eosinophil Rel % 07/13/2021 2  Not Estab. % Final   • Basophil Rel % 07/13/2021 1  Not Estab. % Final   • Neutrophils Absolute 07/13/2021 3.6  1.4 - 7.0 x10E3/uL Final   • Lymphocytes Absolute 07/13/2021 1.3  0.7 - 3.1 x10E3/uL Final   • Monocytes Absolute 07/13/2021 0.5  0.1 - 0.9 x10E3/uL Final   • Eosinophils Absolute 07/13/2021 0.1  0.0 - 0.4 x10E3/uL Final   • Basophils Absolute 07/13/2021 0.0  0.0 - 0.2 x10E3/uL Final   • Immature Granulocyte Rel % 07/13/2021 0  Not Estab. % Final   • Immature Grans Absolute 07/13/2021 0.0  0.0 - 0.1 x10E3/uL Final   • PSA 07/13/2021 1.5  0.0 - 4.0 ng/mL Final    Comment: Roche ECLIA methodology.  According to the American Urological Association, Serum PSA should  decrease and remain at undetectable levels after radical  prostatectomy. The AUA defines biochemical recurrence as an initial  PSA value 0.2 ng/mL or greater followed by a subsequent confirmatory  PSA value 0.2 ng/mL or greater.  Values obtained with different assay methods or kits cannot be used  interchangeably. Results cannot be interpreted as absolute evidence  of the presence or absence of malignant disease.           Assessment/Plan   Diagnoses and all orders for this visit:    1. Benign essential hypertension (Primary)    2. Chronic idiopathic constipation    3. Mixed hyperlipidemia    4. JERONIMO on CPAP    5. Benign  prostatic hyperplasia without lower urinary tract symptoms    Malvin has several medical problems that are currently stable.  Blood pressure is well controlled.  Continue current doses of lisinopril and Flomax.  Hyperlipidemia is also well controlled.  He is tolerating Crestor.  Continue that at 5 mg/day.  We have discontinued Linzess from his medication list.  His chronic constipation seems to be well controlled with occasional use of milk of magnesia.  BPH symptoms are managed with current dose of Flomax.  Continue that.  I made note that his CPAP machine is out of commission for a while.  He has talked to his DME supplier, his sleep medicine doctor and no one seems to be able to get him a temporary machine.  Exam of the left wrist is unremarkable.  I have offered to arrange nerve conduction testing or get a referral to [facility] who can do in-house carpal tunnel testing.  It does not bother him enough that he wants to proceed with additional testing at the present time. We will follow this and let symptom progression dictate when further evaluation will be needed.           Call with any problems or concerns before next visit  Return in about 6 months (around 1/27/2022).      Much of this report is an electronic transcription of spoken language to printed text using Dragon dictation software.  As such, the subtleties and finesse of spoken language may permit erroneous, or at times, nonsensical words or phrases to be inadvertently transcribed; thus changes may be made at a later date to rectify these errors.

## 2021-08-24 ENCOUNTER — TELEPHONE (OUTPATIENT)
Dept: FAMILY MEDICINE CLINIC | Facility: CLINIC | Age: 71
End: 2021-08-24

## 2021-08-24 NOTE — TELEPHONE ENCOUNTER
Caller: OLIVIER ESPAÑA    Relationship to patient: Emergency Contact    Best call back number: [phone]     What is the patient's temperature: 99.9    How was the temperature taken: ORALLY     Does the patient have any other symptoms:   [x] Yes  [] No   If yes, what are the symptoms: BODY ACHES / CHILLS    Is the patient not acting like himself/herself:   [x] Yes  [] No   I no, explain:     What medications has the patient tried to bring the fever down: PATIENT HAS TOOK TYLENOL ABOUT  10 THIS MORNING SHE STATED HE HAS NOT TAKEN ANYMORE     Has the fever come down in response to medication: PATIENTS SPOUSE STATED THE FEVER KEEPS GOING UP AND NOT REALLY GOING DOWN.HE IS EATING AND DRINKING NORMALLY.

## 2021-08-24 NOTE — TELEPHONE ENCOUNTER
Spoke with loco, she is going to take patient to the [facility] [place]

## 2021-08-25 ENCOUNTER — OFFICE VISIT (OUTPATIENT)
Dept: FAMILY MEDICINE CLINIC | Facility: CLINIC | Age: 71
End: 2021-08-25

## 2021-08-25 VITALS
OXYGEN SATURATION: 98 % | WEIGHT: 187.2 LBS | TEMPERATURE: 98.2 F | HEART RATE: 65 BPM | SYSTOLIC BLOOD PRESSURE: 106 MMHG | DIASTOLIC BLOOD PRESSURE: 69 MMHG | BODY MASS INDEX: 25.35 KG/M2 | HEIGHT: 72 IN

## 2021-08-25 DIAGNOSIS — R52 BODY ACHES: Primary | ICD-10-CM

## 2021-08-25 DIAGNOSIS — B34.9 VIRAL SYNDROME: ICD-10-CM

## 2021-08-25 DIAGNOSIS — Z20.822 ENCOUNTER FOR LABORATORY TESTING FOR COVID-19 VIRUS: ICD-10-CM

## 2021-08-25 PROCEDURE — 99213 OFFICE O/P EST LOW 20 MIN: CPT | Performed by: NURSE PRACTITIONER

## 2021-08-25 NOTE — PATIENT INSTRUCTIONS
Covid testing today  Quarantine till results return if positive quarantine 10 days from onset of symptoms  Follow-up with Dr. Hirsch as needed

## 2021-08-25 NOTE — PROGRESS NOTES
"    Malvin Bustillo is a 71 y.o. male.     71-year-old white male patient of Dr. Hirsch's with 24-hour history of fatigue headaches fever dyspnea and body aches he wanted to be Covid tested today.  I instructed patient to quarantine till results return if they are positive to quarantine 10 days from onset of symptoms.  Patient states yesterday he felt pretty bad but today feels a whole lot better after hopefully just a normal one of the male virus.  He was vaccinated with moderna, vital signs stable          Covid testing today  Quarantine till results return if positive quarantine 10 days from onset of symptoms  Follow-up with Dr. Hirsch as needed         The following portions of the patient's history were reviewed and updated as appropriate: allergies, current medications, past family history, past medical history, past social history, past surgical history and problem list.    Vitals:    08/25/21 1123   BP: 106/69   BP Location: Right arm   Patient Position: Sitting   Cuff Size: Adult   Pulse: 65   Temp: 98.2 °F (36.8 °C)   TempSrc: Infrared   SpO2: 98%   Weight: 84.9 kg (187 lb 3.2 oz)   Height: 182.9 cm (72.01\")     Body mass index is 25.38 kg/m².    Past Medical History:   Diagnosis Date   • Arthritis    • Back pain     LEFT   • Body aches    • BPH (benign prostatic hyperplasia)    • Constipation    • Degenerative joint disease of left knee    • Dysuria    • Elevated blood pressure reading    • Fasting hyperglycemia    • Hematuria, gross    • HTN (hypertension), benign     CONTROLLED   • Hx of adenomatous colonic polyps    • Hyperglycemia    • Hyperlipidemia    • Intermittent vertigo    • Left knee pain    • Leg pain    • Obesity    • JERONIMO on CPAP     Dr. Nur   • PSA     2019 = 1.4   • Seborrheic keratosis    • Sleep apnea    • Urinary frequency    • Urinary retention    • UTI (urinary tract infection)     RECURRENT   • VACCINES     FLU= 2018/ 2019     Past Surgical History:   Procedure Laterality Date   • " COLONOSCOPY  2013 2013= TA/ 2018= NEG, Rech 2023   • HEMORRHOIDECTOMY     • INGUINAL HERNIA REPAIR Left    • ORCHIECTOMY Left     secondary due to hernia incarceration   • TONSILLECTOMY AND ADENOIDECTOMY       Family History   Problem Relation Age of Onset   • Hypertension Mother    • Heart disease Mother    • Depression Mother    • Osteoporosis Mother    • Skin cancer Mother    • Thyroid disease Mother    • Coronary artery disease Mother    • Heart disease Father    • Diabetes Father    • Coronary artery disease Father    • Hypertension Brother    • Heart disease Brother    • No Known Problems Sister      Immunization History   Administered Date(s) Administered   • Flu Vaccine Quad PF >36MO 09/30/2016   • Fluad Quad 65+ 09/26/2019, 09/30/2020   • Fluzone High Dose =>65 Years ([facility] ONLY) 09/20/2017, 10/01/2018, 09/26/2019   • Influenza Quad Vaccine (Inpatient) 09/16/2015   • Pneumococcal Conjugate 13-Valent (PCV13) 12/19/2016   • Pneumococcal Polysaccharide (PPSV23) 12/23/2013, 10/12/2018       Results Encounter on 07/14/2021   Component Date Value Ref Range Status   • Glucose 07/13/2021 91  65 - 99 mg/dL Final   • BUN 07/13/2021 19  8 - 27 mg/dL Final   • Creatinine 07/13/2021 0.91  0.76 - 1.27 mg/dL Final   • eGFR Non  Am 07/13/2021 86  >59 mL/min/1.73 Final   • eGFR African Am 07/13/2021 99  >59 mL/min/1.73 Final    Comment: **[facility] currently reports eGFR in compliance with the current**    recommendations of the National Kidney Foundation. [facility] will    update reporting as new guidelines are published from the NKF-ASN    Task force.     • BUN/Creatinine Ratio 07/13/2021 21  10 - 24 Final   • Sodium 07/13/2021 142  134 - 144 mmol/L Final   • Potassium 07/13/2021 4.3  3.5 - 5.2 mmol/L Final   • Chloride 07/13/2021 106  96 - 106 mmol/L Final   • Total CO2 07/13/2021 23  20 - 29 mmol/L Final   • Calcium 07/13/2021 9.5  8.6 - 10.2 mg/dL Final   • Total Protein 07/13/2021 6.8  6.0 - 8.5 g/dL Final   •  Albumin 07/13/2021 4.6  3.8 - 4.8 g/dL Final   • Globulin 07/13/2021 2.2  1.5 - 4.5 g/dL Final   • A/G Ratio 07/13/2021 2.1  1.2 - 2.2 Final   • Total Bilirubin 07/13/2021 0.8  0.0 - 1.2 mg/dL Final   • Alkaline Phosphatase 07/13/2021 90  48 - 121 IU/L Final   • AST (SGOT) 07/13/2021 15  0 - 40 IU/L Final   • ALT (SGPT) 07/13/2021 21  0 - 44 IU/L Final   • Total Cholesterol 07/13/2021 148  100 - 199 mg/dL Final   • Triglycerides 07/13/2021 96  0 - 149 mg/dL Final   • HDL Cholesterol 07/13/2021 40  >39 mg/dL Final   • VLDL Cholesterol Eleno 07/13/2021 18  5 - 40 mg/dL Final   • LDL Chol Calc ([facility]) 07/13/2021 90  0 - 99 mg/dL Final   • WBC 07/13/2021 5.6  3.4 - 10.8 x10E3/uL Final   • RBC 07/13/2021 5.05  4.14 - 5.80 x10E6/uL Final   • Hemoglobin 07/13/2021 15.6  13.0 - 17.7 g/dL Final   • Hematocrit 07/13/2021 46.9  37.5 - 51.0 % Final   • MCV 07/13/2021 93  79 - 97 fL Final   • MCH 07/13/2021 30.9  26.6 - 33.0 pg Final   • MCHC 07/13/2021 33.3  31.5 - 35.7 g/dL Final   • RDW 07/13/2021 12.1  11.6 - 15.4 % Final   • Platelets 07/13/2021 182  150 - 450 x10E3/uL Final   • Neutrophil Rel % 07/13/2021 65  Not Estab. % Final   • Lymphocyte Rel % 07/13/2021 23  Not Estab. % Final   • Monocyte Rel % 07/13/2021 9  Not Estab. % Final   • Eosinophil Rel % 07/13/2021 2  Not Estab. % Final   • Basophil Rel % 07/13/2021 1  Not Estab. % Final   • Neutrophils Absolute 07/13/2021 3.6  1.4 - 7.0 x10E3/uL Final   • Lymphocytes Absolute 07/13/2021 1.3  0.7 - 3.1 x10E3/uL Final   • Monocytes Absolute 07/13/2021 0.5  0.1 - 0.9 x10E3/uL Final   • Eosinophils Absolute 07/13/2021 0.1  0.0 - 0.4 x10E3/uL Final   • Basophils Absolute 07/13/2021 0.0  0.0 - 0.2 x10E3/uL Final   • Immature Granulocyte Rel % 07/13/2021 0  Not Estab. % Final   • Immature Grans Absolute 07/13/2021 0.0  0.0 - 0.1 x10E3/uL Final   • PSA 07/13/2021 1.5  0.0 - 4.0 ng/mL Final    Comment: Roche ECLIA methodology.  According to the American Urological Association, Serum  PSA should  decrease and remain at undetectable levels after radical  prostatectomy. The AUA defines biochemical recurrence as an initial  PSA value 0.2 ng/mL or greater followed by a subsequent confirmatory  PSA value 0.2 ng/mL or greater.  Values obtained with different assay methods or kits cannot be used  interchangeably. Results cannot be interpreted as absolute evidence  of the presence or absence of malignant disease.           Review of Systems   Constitutional: Positive for chills and fever.   HENT: Positive for congestion.    Respiratory: Positive for shortness of breath.    Cardiovascular: Negative.    Gastrointestinal: Negative.    Genitourinary: Negative.    Musculoskeletal: Positive for myalgias.   Skin: Negative.    Neurological: Positive for headache.   Psychiatric/Behavioral: Negative.        Objective   Physical Exam  Constitutional:       Appearance: Normal appearance.   HENT:      Head: Normocephalic.   Cardiovascular:      Rate and Rhythm: Normal rate and regular rhythm.      Pulses: Normal pulses.      Heart sounds: Normal heart sounds.   Pulmonary:      Effort: Pulmonary effort is normal.   Musculoskeletal:         General: Normal range of motion.   Skin:     General: Skin is warm and dry.   Neurological:      General: No focal deficit present.      Mental Status: He is alert and oriented to person, place, and time.   Psychiatric:         Mood and Affect: Mood normal.         Behavior: Behavior normal.         Procedures    Assessment/Plan   Diagnoses and all orders for this visit:    1. Body aches (Primary)  -     COVID-19,[facility] ROUTINE, NP/OP SWAB IN TRANSPORT MEDIA OR ESWAB 72 HR TAT - Swab, Nasopharynx    2. Viral syndrome    3. Encounter for laboratory testing for COVID-19 virus          Current Outpatient Medications:   •  aspirin (ASPIRIN ADULT LOW DOSE) 81 MG EC tablet, Take 1 tablet by mouth Daily., Disp: , Rfl:   •  BENZOYL PEROXIDE 10 % external wash, Apply 1 application topically  to the appropriate area as directed Daily., Disp: , Rfl: 5  •  cholecalciferol (VITAMIN D3) 1000 units tablet, Take 1 tablet by mouth Daily., Disp: , Rfl:   •  clindamycin (CLEOCIN T) 1 % external solution, Apply 1 application topically to the appropriate area as directed Daily., Disp: , Rfl: 5  •  diclofenac (VOLTAREN) 50 MG EC tablet, TAKE 1 TABLET BY MOUTH TWICE DAILY AS NEEDED (JOINT  PAIN), Disp: 180 tablet, Rfl: 1  •  docusate sodium (COLACE) 100 MG capsule, Take 1 capsule by mouth 2 (Two) Times a Day., Disp: , Rfl:   •  Fish Oil-Cholecalciferol (FISH OIL + D3 PO), Take 2 capsules by mouth Daily., Disp: , Rfl:   •  L-Lysine 500 MG tablet tablet, Take 1 tablet by mouth Daily., Disp: , Rfl:   •  lisinopril (PRINIVIL,ZESTRIL) 5 MG tablet, Take 1 tablet by mouth once daily, Disp: 90 tablet, Rfl: 3  •  methylcellulose oral powder, Take  by mouth Daily., Disp: , Rfl:   •  MULTIPLE VITAMIN PO, Take 1 tablet by mouth Daily., Disp: , Rfl:   •  rosuvastatin (CRESTOR) 5 MG tablet, Take 1 tablet by mouth once daily, Disp: 90 tablet, Rfl: 3  •  tamsulosin (FLOMAX) 0.4 MG capsule 24 hr capsule, Take 1 capsule by mouth once daily, Disp: 90 capsule, Rfl: 3  •  Wheat Dextrin (EQ FIBER POWDER PO), Take  by mouth., Disp: , Rfl:   •  Zinc 50 MG tablet, Take 1 tablet by mouth Daily., Disp: , Rfl:

## 2021-08-26 LAB
LABCORP SARS-COV-2, NAA 2 DAY TAT: NORMAL
SARS-COV-2 RNA RESP QL NAA+PROBE: NOT DETECTED

## 2021-08-30 ENCOUNTER — TELEPHONE (OUTPATIENT)
Dept: FAMILY MEDICINE CLINIC | Facility: CLINIC | Age: 71
End: 2021-08-30

## 2021-09-23 ENCOUNTER — TELEPHONE (OUTPATIENT)
Dept: FAMILY MEDICINE CLINIC | Facility: CLINIC | Age: 71
End: 2021-09-23

## 2021-09-23 NOTE — TELEPHONE ENCOUNTER
Caller: Malvin Bustillo    Relationship: Self    Best call back number: [phone]    What is the best time to reach you: ANY TIME    Who are you requesting to speak with (clinical staff, provider,  specific staff member): CLINICAL STAFF      What was the call regarding: ”PATIENT CALLED IN TODAY CURIOUS IF WE HAVE ANY FLU SHOTS IN STOCK YET. PLEASE CONTACT PATIENT ONCE SUPPLIES ARE IN THE OFFICE.      Do you require a callback: YES

## 2021-09-23 NOTE — TELEPHONE ENCOUNTER
I spoke with patient. I advised him we didn't have the high dose flu vax in yet and he can try us back in a week to see if we do then, he voiced understanding.

## 2021-10-13 ENCOUNTER — FLU SHOT (OUTPATIENT)
Dept: FAMILY MEDICINE CLINIC | Facility: CLINIC | Age: 71
End: 2021-10-13

## 2021-10-13 DIAGNOSIS — Z23 NEED FOR PROPHYLACTIC VACCINATION AND INOCULATION AGAINST INFLUENZA: Primary | ICD-10-CM

## 2021-10-13 PROCEDURE — G0008 ADMIN INFLUENZA VIRUS VAC: HCPCS | Performed by: FAMILY MEDICINE

## 2021-10-13 PROCEDURE — 90662 IIV NO PRSV INCREASED AG IM: CPT | Performed by: FAMILY MEDICINE

## 2021-10-13 NOTE — PROGRESS NOTES
Immunization  Immunization performed in right deltoid by Lupis Gonzalez MA. Patient tolerated the procedure well without complications.  10/13/21   Lupis Gonzalez MA

## 2022-01-06 DIAGNOSIS — M51.36 LUMBAR DEGENERATIVE DISC DISEASE: ICD-10-CM

## 2022-01-28 ENCOUNTER — OFFICE VISIT (OUTPATIENT)
Dept: FAMILY MEDICINE CLINIC | Facility: CLINIC | Age: 72
End: 2022-01-28

## 2022-01-28 VITALS
BODY MASS INDEX: 26.41 KG/M2 | OXYGEN SATURATION: 98 % | HEART RATE: 60 BPM | WEIGHT: 195 LBS | HEIGHT: 72 IN | DIASTOLIC BLOOD PRESSURE: 78 MMHG | SYSTOLIC BLOOD PRESSURE: 130 MMHG | TEMPERATURE: 97.8 F

## 2022-01-28 DIAGNOSIS — I10 BENIGN ESSENTIAL HYPERTENSION: ICD-10-CM

## 2022-01-28 DIAGNOSIS — N40.0 BENIGN PROSTATIC HYPERPLASIA WITHOUT LOWER URINARY TRACT SYMPTOMS: ICD-10-CM

## 2022-01-28 DIAGNOSIS — Z11.59 NEED FOR HEPATITIS C SCREENING TEST: Primary | ICD-10-CM

## 2022-01-28 DIAGNOSIS — Z12.5 ENCOUNTER FOR PROSTATE CANCER SCREENING: ICD-10-CM

## 2022-01-28 DIAGNOSIS — E78.2 MIXED HYPERLIPIDEMIA: ICD-10-CM

## 2022-01-28 PROCEDURE — 99214 OFFICE O/P EST MOD 30 MIN: CPT | Performed by: FAMILY MEDICINE

## 2022-01-28 RX ORDER — ROSUVASTATIN CALCIUM 5 MG/1
5 TABLET, COATED ORAL DAILY
Qty: 90 TABLET | Refills: 3 | Status: SHIPPED | OUTPATIENT
Start: 2022-01-28 | End: 2023-02-21

## 2022-01-28 RX ORDER — AMOXICILLIN 500 MG/1
1000 CAPSULE ORAL 2 TIMES DAILY
COMMUNITY
End: 2022-08-26

## 2022-01-28 RX ORDER — LISINOPRIL 5 MG/1
5 TABLET ORAL DAILY
Qty: 90 TABLET | Refills: 3 | Status: SHIPPED | OUTPATIENT
Start: 2022-01-28 | End: 2022-03-18

## 2022-01-28 RX ORDER — TAMSULOSIN HYDROCHLORIDE 0.4 MG/1
1 CAPSULE ORAL DAILY
Qty: 90 CAPSULE | Refills: 3 | Status: SHIPPED | OUTPATIENT
Start: 2022-01-28

## 2022-01-28 NOTE — PROGRESS NOTES
Subjective   Malvin Bustillo is a 71 y.o. male.   Chief Complaint   Patient presents with   • Hypertension       History of Present Illness   Presents to the office today for recheck on hypertension and refills.    No side effects of medication such as swelling or headaches.  Brings blood pressure log from home. Blood pressure is average high 120s.    Recently had some dental work. Currently on Amoxil due to dental abscess    No other complaints today.    Sees his urologist in early August and typically gets a PSA done before then.      Patient Active Problem List    Diagnosis Date Noted   • JERONIMO on CPAP 09/26/2019     Note Last Updated: 9/26/2019     Dr. Nur     • Lumbar degenerative disc disease 08/06/2019     Note Last Updated: 8/6/2019     Imaged on abd series 2018     • Mixed hyperlipidemia 08/05/2019   • Sleep apnea 08/05/2019   • Constipation 10/01/2018   • Other dorsalgia 06/14/2018   • Benign prostatic hyperplasia 04/02/2018   • Degenerative joint disease of left knee 01/11/2018   • Benign essential hypertension 12/11/2017   • Knee pain, left 12/11/2017   • Seborrheic keratosis 12/11/2017   • Recurrent urinary tract infection 10/10/2017   • Adenomatous polyp of colon 06/20/2016   • Body mass index (BMI) of 25.0 to 29.9 12/21/2015   • Intermittent vertigo 12/05/2014   • Fasting hyperglycemia 12/18/2013   • Degenerative joint disease 01/01/1960           Past Surgical History:   Procedure Laterality Date   • COLONOSCOPY  2013 2013= TA/ 2018= NEG, Rech 2023   • HEMORRHOIDECTOMY     • INGUINAL HERNIA REPAIR Left    • ORCHIECTOMY Left     secondary due to hernia incarceration   • TONSILLECTOMY AND ADENOIDECTOMY       Current Outpatient Medications on File Prior to Visit   Medication Sig   • amoxicillin (AMOXIL) 500 MG capsule Take 1,000 mg by mouth 2 (Two) Times a Day.   • aspirin (ASPIRIN ADULT LOW DOSE) 81 MG EC tablet Take 1 tablet by mouth Daily.   • BENZOYL PEROXIDE 10 % external wash Apply 1 application  topically to the appropriate area as directed Daily.   • cholecalciferol (VITAMIN D3) 1000 units tablet Take 1 tablet by mouth Daily.   • clindamycin (CLEOCIN T) 1 % external solution Apply 1 application topically to the appropriate area as directed Daily.   • diclofenac (VOLTAREN) 50 MG EC tablet TAKE 1 TABLET BY MOUTH TWICE DAILY AS NEEDED (JOINT  PAIN)   • docusate sodium (COLACE) 100 MG capsule Take 1 capsule by mouth 2 (Two) Times a Day.   • Fish Oil-Cholecalciferol (FISH OIL + D3 PO) Take 2 capsules by mouth Daily.   • L-Lysine 500 MG tablet tablet Take 1 tablet by mouth Daily.   • methylcellulose oral powder Take  by mouth Daily.   • MULTIPLE VITAMIN PO Take 1 tablet by mouth Daily.   • Wheat Dextrin (EQ FIBER POWDER PO) Take  by mouth.   • Zinc 50 MG tablet Take 1 tablet by mouth Daily.   • [DISCONTINUED] lisinopril (PRINIVIL,ZESTRIL) 5 MG tablet Take 1 tablet by mouth once daily   • [DISCONTINUED] rosuvastatin (CRESTOR) 5 MG tablet Take 1 tablet by mouth once daily   • [DISCONTINUED] tamsulosin (FLOMAX) 0.4 MG capsule 24 hr capsule Take 1 capsule by mouth once daily     No current facility-administered medications on file prior to visit.     Allergies   Allergen Reactions   • Lipitor [Atorvastatin Calcium] Myalgia     Social History     Socioeconomic History   • Marital status:    Tobacco Use   • Smoking status: Former Smoker     Packs/day: 2.00     Years: 10.00     Pack years: 20.00     Types: Cigarettes     Start date:      Quit date:      Years since quittin.0   • Smokeless tobacco: Never Used   Substance and Sexual Activity   • Alcohol use: Yes     Alcohol/week: 7.0 standard drinks     Types: 7 Cans of beer per week   • Drug use: No     Family History   Problem Relation Age of Onset   • Hypertension Mother    • Heart disease Mother    • Depression Mother    • Osteoporosis Mother    • Skin cancer Mother    • Thyroid disease Mother    • Coronary artery disease Mother    • Heart disease  "Father    • Diabetes Father    • Coronary artery disease Father    • Hypertension Brother    • Heart disease Brother    • No Known Problems Sister        Review of Systems    Objective   /78 (BP Location: Left arm, Patient Position: Sitting, Cuff Size: Adult)   Pulse 60   Temp 97.8 °F (36.6 °C) (Oral)   Ht 182.9 cm (72.01\")   Wt 88.5 kg (195 lb)   SpO2 98%   BMI 26.44 kg/m²   Physical Exam  Constitutional:       Appearance: He is well-developed.      Comments: Wearing a face mask     HENT:      Head: Normocephalic and atraumatic.   Eyes:      Conjunctiva/sclera: Conjunctivae normal.   Cardiovascular:      Rate and Rhythm: Normal rate.   Pulmonary:      Effort: Pulmonary effort is normal.   Musculoskeletal:         General: Normal range of motion.      Cervical back: Normal range of motion.   Skin:     General: Skin is warm and dry.      Findings: No rash.   Neurological:      Mental Status: He is alert and oriented to person, place, and time.   Psychiatric:         Behavior: Behavior normal.           No visits with results within 4 Month(s) from this visit.   Latest known visit with results is:   Office Visit on 08/25/2021   Component Date Value Ref Range Status   • SARS-CoV-2, JIMENEZ 08/25/2021 Not Detected  Not Detected Final    Comment: This nucleic acid amplification test was developed and its performance  characteristics determined by [employer]. Nucleic acid  amplification tests include RT-PCR and TMA. This test has not been  FDA cleared or approved. This test has been authorized by FDA under  an Emergency Use Authorization (EUA). This test is only authorized  for the duration of time the declaration that circumstances exist  justifying the authorization of the emergency use of in vitro  diagnostic tests for detection of SARS-CoV-2 virus and/or diagnosis  of COVID-19 infection under section 564(b)(1) of the Act, 21 U.S.C.  360bbb-3(b) (1), unless the authorization is terminated or " revoked  sooner.  When diagnostic testing is negative, the possibility of a false  negative result should be considered in the context of a patient's  recent exposures and the presence of clinical signs and symptoms  consistent with COVID-19. An individual without symptoms of COVID-19  and who is not shedding SARS-CoV-2 virus wo                           uld expect to have a  negative (not detected) result in this assay.     • [facility] SARS-COV-2, JIMENEZ 2 DAY TAT 08/25/2021 Performed   Final         Assessment/Plan   Diagnoses and all orders for this visit:    1. Need for hepatitis C screening test (Primary)  -     Hepatitis C Antibody; Future    2. Benign essential hypertension  -     lisinopril (PRINIVIL,ZESTRIL) 5 MG tablet; Take 1 tablet by mouth Daily.  Dispense: 90 tablet; Refill: 3  -     CBC & Differential; Future  -     Comprehensive Metabolic Panel; Future    3. Benign prostatic hyperplasia without lower urinary tract symptoms  Comments:  PSA followed by DR. Elias in July  Orders:  -     tamsulosin (FLOMAX) 0.4 MG capsule 24 hr capsule; Take 1 capsule by mouth Daily.  Dispense: 90 capsule; Refill: 3    4. Mixed hyperlipidemia  -     rosuvastatin (CRESTOR) 5 MG tablet; Take 1 tablet by mouth Daily.  Dispense: 90 tablet; Refill: 3  -     Lipid Panel; Future    5. Encounter for prostate cancer screening  -     PSA Screen; Future    Malvin's chronic medical problems are currently stable. Blood pressures under good control. Tolerating medicines without side effects.    No myalgias from rosuvastatin. Last lipid panel was good in July of last year.  Able to urinate fairly well with Flomax. No lightheadedness.    Continue all current medicines at doses listed above.  Follow-up in 6 months with labs a few days before.        Call with any problems or concerns before next visit       Return in about 6 months (around 7/28/2022) for with labs a few days before.      Much of this report is an electronic transcription of  spoken language to printed text using Dragon dictation software.  As such, the subtleties and finesse of spoken language may permit erroneous, or at times, nonsensical words or phrases to be inadvertently transcribed; thus changes may be made at a later date to rectify these errors.     Lidia Hirsch MD1/28/202208:27 EST  This note has been electronically signed

## 2022-03-18 DIAGNOSIS — I10 BENIGN ESSENTIAL HYPERTENSION: ICD-10-CM

## 2022-03-18 RX ORDER — LISINOPRIL 5 MG/1
TABLET ORAL
Qty: 90 TABLET | Refills: 0 | Status: SHIPPED | OUTPATIENT
Start: 2022-03-18 | End: 2022-06-15

## 2022-04-07 DIAGNOSIS — M51.36 LUMBAR DEGENERATIVE DISC DISEASE: ICD-10-CM

## 2022-06-15 DIAGNOSIS — I10 BENIGN ESSENTIAL HYPERTENSION: ICD-10-CM

## 2022-06-15 RX ORDER — LISINOPRIL 5 MG/1
TABLET ORAL
Qty: 90 TABLET | Refills: 0 | Status: SHIPPED | OUTPATIENT
Start: 2022-06-15 | End: 2022-09-14

## 2022-07-08 DIAGNOSIS — M51.36 LUMBAR DEGENERATIVE DISC DISEASE: ICD-10-CM

## 2022-07-16 LAB
ALBUMIN SERPL-MCNC: 4.3 G/DL (ref 3.8–4.8)
ALBUMIN/GLOB SERPL: 2 {RATIO} (ref 1.2–2.2)
ALP SERPL-CCNC: 85 IU/L (ref 44–121)
ALT SERPL-CCNC: 30 IU/L (ref 0–44)
AST SERPL-CCNC: 22 IU/L (ref 0–40)
BASOPHILS # BLD AUTO: 0 X10E3/UL (ref 0–0.2)
BASOPHILS NFR BLD AUTO: 0 %
BILIRUB SERPL-MCNC: 0.6 MG/DL (ref 0–1.2)
BUN SERPL-MCNC: 19 MG/DL (ref 8–27)
BUN/CREAT SERPL: 18 (ref 10–24)
CALCIUM SERPL-MCNC: 9.3 MG/DL (ref 8.6–10.2)
CHLORIDE SERPL-SCNC: 106 MMOL/L (ref 96–106)
CHOLEST SERPL-MCNC: 150 MG/DL (ref 100–199)
CO2 SERPL-SCNC: 21 MMOL/L (ref 20–29)
CREAT SERPL-MCNC: 1.05 MG/DL (ref 0.76–1.27)
EGFRCR SERPLBLD CKD-EPI 2021: 76 ML/MIN/1.73
EOSINOPHIL # BLD AUTO: 0.1 X10E3/UL (ref 0–0.4)
EOSINOPHIL NFR BLD AUTO: 2 %
ERYTHROCYTE [DISTWIDTH] IN BLOOD BY AUTOMATED COUNT: 12.2 % (ref 11.6–15.4)
GLOBULIN SER CALC-MCNC: 2.2 G/DL (ref 1.5–4.5)
GLUCOSE SERPL-MCNC: 99 MG/DL (ref 65–99)
HCT VFR BLD AUTO: 45.8 % (ref 37.5–51)
HDLC SERPL-MCNC: 42 MG/DL
HGB BLD-MCNC: 15.3 G/DL (ref 13–17.7)
IMM GRANULOCYTES # BLD AUTO: 0 X10E3/UL (ref 0–0.1)
IMM GRANULOCYTES NFR BLD AUTO: 0 %
LDLC SERPL CALC-MCNC: 93 MG/DL (ref 0–99)
LYMPHOCYTES # BLD AUTO: 1.3 X10E3/UL (ref 0.7–3.1)
LYMPHOCYTES NFR BLD AUTO: 26 %
MCH RBC QN AUTO: 30.8 PG (ref 26.6–33)
MCHC RBC AUTO-ENTMCNC: 33.4 G/DL (ref 31.5–35.7)
MCV RBC AUTO: 92 FL (ref 79–97)
MONOCYTES # BLD AUTO: 0.5 X10E3/UL (ref 0.1–0.9)
MONOCYTES NFR BLD AUTO: 10 %
NEUTROPHILS # BLD AUTO: 3.1 X10E3/UL (ref 1.4–7)
NEUTROPHILS NFR BLD AUTO: 62 %
PLATELET # BLD AUTO: 180 X10E3/UL (ref 150–450)
POTASSIUM SERPL-SCNC: 4.5 MMOL/L (ref 3.5–5.2)
PROT SERPL-MCNC: 6.5 G/DL (ref 6–8.5)
PSA SERPL-MCNC: 1.4 NG/ML (ref 0–4)
RBC # BLD AUTO: 4.97 X10E6/UL (ref 4.14–5.8)
SODIUM SERPL-SCNC: 142 MMOL/L (ref 134–144)
TRIGL SERPL-MCNC: 74 MG/DL (ref 0–149)
VLDLC SERPL CALC-MCNC: 15 MG/DL (ref 5–40)
WBC # BLD AUTO: 5 X10E3/UL (ref 3.4–10.8)

## 2022-08-26 ENCOUNTER — OFFICE VISIT (OUTPATIENT)
Dept: FAMILY MEDICINE CLINIC | Facility: CLINIC | Age: 72
End: 2022-08-26

## 2022-08-26 VITALS
WEIGHT: 193 LBS | SYSTOLIC BLOOD PRESSURE: 123 MMHG | DIASTOLIC BLOOD PRESSURE: 73 MMHG | HEIGHT: 72 IN | HEART RATE: 71 BPM | TEMPERATURE: 97.5 F | RESPIRATION RATE: 18 BRPM | OXYGEN SATURATION: 97 % | BODY MASS INDEX: 26.14 KG/M2

## 2022-08-26 DIAGNOSIS — I10 BENIGN ESSENTIAL HYPERTENSION: Primary | ICD-10-CM

## 2022-08-26 DIAGNOSIS — G47.33 OSA ON CPAP: ICD-10-CM

## 2022-08-26 DIAGNOSIS — E78.2 MIXED HYPERLIPIDEMIA: ICD-10-CM

## 2022-08-26 DIAGNOSIS — Z99.89 OSA ON CPAP: ICD-10-CM

## 2022-08-26 DIAGNOSIS — R06.02 SOB (SHORTNESS OF BREATH) ON EXERTION: ICD-10-CM

## 2022-08-26 DIAGNOSIS — N40.0 BENIGN PROSTATIC HYPERPLASIA WITHOUT LOWER URINARY TRACT SYMPTOMS: ICD-10-CM

## 2022-08-26 PROCEDURE — 99214 OFFICE O/P EST MOD 30 MIN: CPT | Performed by: FAMILY MEDICINE

## 2022-08-26 NOTE — PROGRESS NOTES
Subjective   Malvin Bustillo is a 72 y.o. male.   Chief Complaint   Patient presents with   • Hypertension   • Hyperlipidemia   • Benign Prostatic Hypertrophy   • Fatigue   • Memory Loss   • Shortness of Breath       History of Present Illness   Presents to the office today for general follow-up.  I last saw him in January.  Had some blood work over a month ago.  We had to reschedule his appointment once because I could not be here and wants because he could not be here.  At that time he had CBC, CMP, lipid panel, PSA.  PSA was remarkably stable at 1.4.  He brings a written list of questions.    Tightness in chest when taking a deep breath- he explains that he if he is outside working, suddenly he will feel like his breath is taken away.  Interestingly, he sees Dr. Nur for sleep apnea.  He started him on oxygen at night.  It sounds like he had spirometry done 2 weeks ago.  He has not gotten the results of that back.      Sometimes tired more than usual    Memory-Prevagen?  Asks if that would be helpful.      Blood tests-do they show cancer?  Discussed this with him.  He had a colonoscopy in 2013 and was advised to recheck in 2023.  He had a chest x-ray 2 years ago that showed a pectus carinatum but no suspicious lesions.  He brings a blood pressure log and his blood pressures are almost all below 130 systolic.  Heart rate is 60-80.  He is on lisinopril 5 mg/day and tolerating that without a cough.  CMP a few days ago was completely normal.      BPH-asymptomatic.  On Flomax.  PSA as above was excellent at 1.4.    HLD-lipid panel a month ago was excellent.  He continues on Crestor 5 mg a day and is tolerating that without myalgias.    Patient Active Problem List    Diagnosis Date Noted   • JERONIMO on CPAP 09/26/2019     Note Last Updated: 9/26/2019     Dr. Nur     • Lumbar degenerative disc disease 08/06/2019     Note Last Updated: 8/6/2019     Imaged on abd series 2018     • Mixed hyperlipidemia 08/05/2019   • Sleep apnea  08/05/2019   • Constipation 10/01/2018   • Other dorsalgia 06/14/2018   • Benign prostatic hyperplasia 04/02/2018   • Degenerative joint disease of left knee 01/11/2018   • Benign essential hypertension 12/11/2017   • Knee pain, left 12/11/2017   • Seborrheic keratosis 12/11/2017   • Recurrent urinary tract infection 10/10/2017   • Adenomatous polyp of colon 06/20/2016   • Body mass index (BMI) of 25.0 to 29.9 12/21/2015   • Intermittent vertigo 12/05/2014   • Fasting hyperglycemia 12/18/2013   • Degenerative joint disease 01/01/1960           Past Surgical History:   Procedure Laterality Date   • COLONOSCOPY  2013 2013= TA/ 2018= NEG, Rech 2023   • HEMORRHOIDECTOMY     • INGUINAL HERNIA REPAIR Left    • ORCHIECTOMY Left     secondary due to hernia incarceration   • TONSILLECTOMY AND ADENOIDECTOMY       Current Outpatient Medications on File Prior to Visit   Medication Sig   • aspirin (aspirin) 81 MG EC tablet Take 1 tablet by mouth Daily.   • cholecalciferol (VITAMIN D3) 1000 units tablet Take 1 tablet by mouth Daily.   • diclofenac (VOLTAREN) 50 MG EC tablet TAKE 1 TABLET BY MOUTH TWICE DAILY AS NEEDED FOR JOINT PAIN   • docusate sodium (COLACE) 100 MG capsule Take 1 capsule by mouth 2 (Two) Times a Day.   • L-Lysine 500 MG tablet tablet Take 1 tablet by mouth Daily.   • lisinopril (PRINIVIL,ZESTRIL) 5 MG tablet Take 1 tablet by mouth once daily   • MULTIPLE VITAMIN PO Take 1 tablet by mouth Daily.   • rosuvastatin (CRESTOR) 5 MG tablet Take 1 tablet by mouth Daily.   • tamsulosin (FLOMAX) 0.4 MG capsule 24 hr capsule Take 1 capsule by mouth Daily.   • Zinc 50 MG tablet Take 1 tablet by mouth Daily.   • [DISCONTINUED] amoxicillin (AMOXIL) 500 MG capsule Take 1,000 mg by mouth 2 (Two) Times a Day.   • [DISCONTINUED] BENZOYL PEROXIDE 10 % external wash Apply 1 application topically to the appropriate area as directed Daily.   • [DISCONTINUED] clindamycin (CLEOCIN T) 1 % external solution Apply 1 application  "topically to the appropriate area as directed Daily.   • [DISCONTINUED] Fish Oil-Cholecalciferol (FISH OIL + D3 PO) Take 2 capsules by mouth Daily.   • [DISCONTINUED] methylcellulose oral powder Take  by mouth Daily.   • [DISCONTINUED] Wheat Dextrin (EQ FIBER POWDER PO) Take  by mouth.     No current facility-administered medications on file prior to visit.     Allergies   Allergen Reactions   • Lipitor [Atorvastatin Calcium] Myalgia     Social History     Socioeconomic History   • Marital status:    Tobacco Use   • Smoking status: Former Smoker     Packs/day: 2.00     Years: 10.00     Pack years: 20.00     Types: Cigarettes     Start date:      Quit date:      Years since quittin.6   • Smokeless tobacco: Never Used   Vaping Use   • Vaping Use: Never used   Substance and Sexual Activity   • Alcohol use: Yes     Alcohol/week: 8.0 standard drinks     Types: 8 Cans of beer per week   • Drug use: No   • Sexual activity: Defer     Family History   Problem Relation Age of Onset   • Hypertension Mother    • Heart disease Mother    • Depression Mother    • Osteoporosis Mother    • Skin cancer Mother    • Thyroid disease Mother    • Coronary artery disease Mother    • Heart disease Father    • Diabetes Father    • Coronary artery disease Father    • Hypertension Brother    • Heart disease Brother    • No Known Problems Sister        Review of Systems    Objective   /73 (BP Location: Right arm, Patient Position: Sitting, Cuff Size: Adult)   Pulse 71   Temp 97.5 °F (36.4 °C) (Infrared)   Resp 18   Ht 182.9 cm (72.01\")   Wt 87.5 kg (193 lb)   SpO2 97%   BMI 26.17 kg/m²   Physical Exam  Constitutional:       General: He is not in acute distress.     Appearance: Normal appearance. He is well-developed.      Comments: Wearing a face mask     HENT:      Head: Normocephalic and atraumatic.   Eyes:      Conjunctiva/sclera: Conjunctivae normal.   Cardiovascular:      Rate and Rhythm: Normal rate. "   Pulmonary:      Effort: Pulmonary effort is normal.      Breath sounds: Decreased breath sounds present. No wheezing, rhonchi or rales.   Musculoskeletal:         General: Normal range of motion.      Cervical back: Normal range of motion.      Right lower leg: No edema.      Left lower leg: No edema.   Skin:     General: Skin is warm and dry.      Findings: No rash.   Neurological:      Mental Status: He is alert and oriented to person, place, and time.   Psychiatric:         Mood and Affect: Mood normal.         Behavior: Behavior normal.           No visits with results within 4 Month(s) from this visit.   Latest known visit with results is:   Office Visit on 01/28/2022   Component Date Value Ref Range Status   • PSA 07/15/2022 1.4  0.0 - 4.0 ng/mL Final    Comment: Roche ECLIA methodology.  According to the American Urological Association, Serum PSA should  decrease and remain at undetectable levels after radical  prostatectomy. The AUA defines biochemical recurrence as an initial  PSA value 0.2 ng/mL or greater followed by a subsequent confirmatory  PSA value 0.2 ng/mL or greater.  Values obtained with different assay methods or kits cannot be used  interchangeably. Results cannot be interpreted as absolute evidence  of the presence or absence of malignant disease.     • Total Cholesterol 07/15/2022 150  100 - 199 mg/dL Final   • Triglycerides 07/15/2022 74  0 - 149 mg/dL Final   • HDL Cholesterol 07/15/2022 42  >39 mg/dL Final   • VLDL Cholesterol [place] 07/15/2022 15  5 - 40 mg/dL Final   • LDL Chol Calc ([facility]) 07/15/2022 93  0 - 99 mg/dL Final   • Glucose 07/15/2022 99  65 - 99 mg/dL Final   • BUN 07/15/2022 19  8 - 27 mg/dL Final   • Creatinine 07/15/2022 1.05  0.76 - 1.27 mg/dL Final   • EGFR Result 07/15/2022 76  >59 mL/min/1.73 Final   • BUN/Creatinine Ratio 07/15/2022 18  10 - 24 Final   • Sodium 07/15/2022 142  134 - 144 mmol/L Final   • Potassium 07/15/2022 4.5  3.5 - 5.2 mmol/L Final   • Chloride  07/15/2022 106  96 - 106 mmol/L Final   • Total CO2 07/15/2022 21  20 - 29 mmol/L Final   • Calcium 07/15/2022 9.3  8.6 - 10.2 mg/dL Final   • Total Protein 07/15/2022 6.5  6.0 - 8.5 g/dL Final   • Albumin 07/15/2022 4.3  3.8 - 4.8 g/dL Final   • Globulin 07/15/2022 2.2  1.5 - 4.5 g/dL Final   • A/G Ratio 07/15/2022 2.0  1.2 - 2.2 Final   • Total Bilirubin 07/15/2022 0.6  0.0 - 1.2 mg/dL Final   • Alkaline Phosphatase 07/15/2022 85  44 - 121 IU/L Final   • AST (SGOT) 07/15/2022 22  0 - 40 IU/L Final   • ALT (SGPT) 07/15/2022 30  0 - 44 IU/L Final   • WBC 07/15/2022 5.0  3.4 - 10.8 x10E3/uL Final   • RBC 07/15/2022 4.97  4.14 - 5.80 x10E6/uL Final   • Hemoglobin 07/15/2022 15.3  13.0 - 17.7 g/dL Final   • Hematocrit 07/15/2022 45.8  37.5 - 51.0 % Final   • MCV 07/15/2022 92  79 - 97 fL Final   • MCH 07/15/2022 30.8  26.6 - 33.0 pg Final   • MCHC 07/15/2022 33.4  31.5 - 35.7 g/dL Final   • RDW 07/15/2022 12.2  11.6 - 15.4 % Final   • Platelets 07/15/2022 180  150 - 450 x10E3/uL Final   • Neutrophil Rel % 07/15/2022 62  Not Estab. % Final   • Lymphocyte Rel % 07/15/2022 26  Not Estab. % Final   • Monocyte Rel % 07/15/2022 10  Not Estab. % Final   • Eosinophil Rel % 07/15/2022 2  Not Estab. % Final   • Basophil Rel % 07/15/2022 0  Not Estab. % Final   • Neutrophils Absolute 07/15/2022 3.1  1.4 - 7.0 x10E3/uL Final   • Lymphocytes Absolute 07/15/2022 1.3  0.7 - 3.1 x10E3/uL Final   • Monocytes Absolute 07/15/2022 0.5  0.1 - 0.9 x10E3/uL Final   • Eosinophils Absolute 07/15/2022 0.1  0.0 - 0.4 x10E3/uL Final   • Basophils Absolute 07/15/2022 0.0  0.0 - 0.2 x10E3/uL Final   • Immature Granulocyte Rel % 07/15/2022 0  Not Estab. % Final   • Immature Grans Absolute 07/15/2022 0.0  0.0 - 0.1 x10E3/uL Final         Assessment & Plan   Diagnoses and all orders for this visit:    1. Benign essential hypertension (Primary)    2. Mixed hyperlipidemia    3. Benign prostatic hyperplasia without lower urinary tract symptoms    4. JERONIMO  on CPAP    5. SOB (shortness of breath) on exertion  -     XR Chest PA & Lateral    Multiple things going on here.  His blood pressure control is good at 123/73.  He brings a list of blood pressures with him and they all are below 135 systolic.  Continue current antihypertensive medications at current doses.  Cholesterol level is great.  Continue Crestor 5 mg/day.  BPH is asymptomatic.  Keep follow-up with pulmonology regarding his sleep apnea.  Regarding his shortness of breath, will get a chest x-ray today.  I do think there is probably some answer and the pulmonary function test that were done by his pulmonologist.  I do not have access to any of that but I think it will shed some light on why he is short of breath.  I reviewed the tests he has had done to screen for cancer.  He has not been a smoker for over 25 years.  He may have some COPD from prior smoking, and the chest x-ray will also serve as a bit of a screen for lung cancer.  See above discussion regarding his shortness of breath with activity.  I think if he wants to try something like Prevagen that is fine, I would also recommend that he do some puzzles to keep his brain active.  Follow-up here again in 6 months or sooner if needed.          Call with any problems or concerns before next visit       Return in about 6 months (around 2/26/2023).      Much of this report is an electronic transcription of spoken language to printed text using Dragon dictation software.  As such, the subtleties and finesse of spoken language may permit erroneous, or at times, nonsensical words or phrases to be inadvertently transcribed; thus changes may be made at a later date to rectify these errors.     Lidia Hirsch MD8/26/202213:13 EDT  This note has been electronically signed

## 2022-09-14 DIAGNOSIS — I10 BENIGN ESSENTIAL HYPERTENSION: ICD-10-CM

## 2022-09-14 RX ORDER — LISINOPRIL 5 MG/1
TABLET ORAL
Qty: 90 TABLET | Refills: 3 | Status: SHIPPED | OUTPATIENT
Start: 2022-09-14

## 2022-10-04 ENCOUNTER — FLU SHOT (OUTPATIENT)
Dept: FAMILY MEDICINE CLINIC | Facility: CLINIC | Age: 72
End: 2022-10-04

## 2022-10-04 DIAGNOSIS — Z23 NEED FOR INFLUENZA VACCINATION: Primary | ICD-10-CM

## 2022-10-04 PROCEDURE — G0008 ADMIN INFLUENZA VIRUS VAC: HCPCS | Performed by: FAMILY MEDICINE

## 2022-10-04 PROCEDURE — 90662 IIV NO PRSV INCREASED AG IM: CPT | Performed by: FAMILY MEDICINE

## 2022-10-05 DIAGNOSIS — M51.36 LUMBAR DEGENERATIVE DISC DISEASE: ICD-10-CM

## 2022-12-15 ENCOUNTER — OFFICE VISIT (OUTPATIENT)
Dept: FAMILY MEDICINE CLINIC | Facility: CLINIC | Age: 72
End: 2022-12-15

## 2022-12-15 VITALS
SYSTOLIC BLOOD PRESSURE: 128 MMHG | HEIGHT: 72 IN | DIASTOLIC BLOOD PRESSURE: 60 MMHG | OXYGEN SATURATION: 98 % | HEART RATE: 76 BPM | TEMPERATURE: 97.8 F | BODY MASS INDEX: 27.04 KG/M2 | WEIGHT: 199.6 LBS

## 2022-12-15 DIAGNOSIS — R05.9 COUGH, UNSPECIFIED TYPE: Primary | ICD-10-CM

## 2022-12-15 LAB
EXPIRATION DATE: NORMAL
FLUAV AG UPPER RESP QL IA.RAPID: NOT DETECTED
FLUBV AG UPPER RESP QL IA.RAPID: NOT DETECTED
INTERNAL CONTROL: NORMAL
Lab: NORMAL
SARS-COV-2 AG UPPER RESP QL IA.RAPID: NOT DETECTED

## 2022-12-15 PROCEDURE — 99213 OFFICE O/P EST LOW 20 MIN: CPT | Performed by: NURSE PRACTITIONER

## 2022-12-15 PROCEDURE — 87428 SARSCOV & INF VIR A&B AG IA: CPT | Performed by: NURSE PRACTITIONER

## 2022-12-15 RX ORDER — LEVOFLOXACIN 500 MG/1
500 TABLET, FILM COATED ORAL DAILY
Qty: 7 TABLET | Refills: 0 | Status: SHIPPED | OUTPATIENT
Start: 2022-12-15 | End: 2023-01-09

## 2022-12-15 RX ORDER — METHYLPREDNISOLONE 4 MG/1
TABLET ORAL
Qty: 21 EACH | Refills: 0 | Status: SHIPPED | OUTPATIENT
Start: 2022-12-15 | End: 2022-12-20

## 2022-12-15 RX ORDER — ALBUTEROL SULFATE 90 UG/1
2 AEROSOL, METERED RESPIRATORY (INHALATION) EVERY 4 HOURS PRN
Qty: 18 G | Refills: 0 | Status: SHIPPED | OUTPATIENT
Start: 2022-12-15 | End: 2023-01-20

## 2022-12-15 NOTE — ASSESSMENT & PLAN NOTE
FINDINGS:  Lungs are hyperinflated. A noncalcified nodule is present in the left apex. Scarring in the left apex has a similar appearance to 7/27/2020. No acute airspace disease is seen. Heart size is normal. No pleural effusion or pneumothorax. Moderate degenerative endplate spurring in the thoracic spine. Calcification overlying the left first rib costochondral junction, without significant change from 2020, may represent old healed fracture.     IMPRESSION:  No acute chest finding.

## 2022-12-15 NOTE — PROGRESS NOTES
Patient without any significant acute chest findings.  Please notify him I still want him to take his medications that I prescribed.  Please call patient on his home phone to notify.

## 2022-12-15 NOTE — PROGRESS NOTES
"Chief Complaint  Cough    Subjective          Mlavin Bustillo presents to [facility]      History of Present Illness    Malvin is a 72-year-old male patient of Dr. Lidia Hirsch who presents today with cough.    Patient has a past medical history of hypertension, hyperlipidemia, intermittent vertigo, BPH, DJD, sleep apnea.    Patient reports he has been with a cough for around 2 months now.  Has not gotten better and has not gotten worse.  He does report he is coughing up a thick yellow sputum throughout the day and does have some coughing spells with intermittent shortness of breath.  He has not been taking anything OTC for this.  He is up-to-date on his flu and COVID.  Patient was tested for flu and COVID today both of which were negative.  Patient does state that about a week ago he started developing intermittent chills that are occurring in the evening. Right upper and lower lobes with adventitious wheezing and rhonchi present. Likely patient has a pneumonia. I am going to obtain a chest x-ray to evaluate. Going to go ahead and send treatment into the pharmacy as I do believe this is appropriate.    Objective     Vital Signs:   /60 (BP Location: Right arm, Patient Position: Sitting, Cuff Size: Adult)   Pulse 76   Temp 97.8 °F (36.6 °C) (Oral)   Ht 182.9 cm (72.01\")   Wt 90.5 kg (199 lb 9.6 oz)   SpO2 98%   BMI 27.06 kg/m²           Physical Exam  Vitals reviewed.   Constitutional:       Appearance: He is well-developed.      Comments: Wearing a face mask     HENT:      Head: Normocephalic and atraumatic.      Nose: Nose normal.      Mouth/Throat:      Mouth: Mucous membranes are moist.      Pharynx: Oropharynx is clear.   Eyes:      Conjunctiva/sclera: Conjunctivae normal.      Pupils: Pupils are equal, round, and reactive to light.   Cardiovascular:      Rate and Rhythm: Normal rate and regular rhythm.      Pulses: Normal pulses.      Heart sounds: Normal heart " sounds.   Pulmonary:      Effort: Pulmonary effort is normal.      Breath sounds: Examination of the right-upper field reveals wheezing and rhonchi. Examination of the right-middle field reveals wheezing and rhonchi. Examination of the right-lower field reveals wheezing and rhonchi. Wheezing and rhonchi present. No decreased breath sounds.   Musculoskeletal:         General: Normal range of motion.      Cervical back: Normal range of motion.   Skin:     General: Skin is warm and dry.      Findings: No rash.   Neurological:      Mental Status: He is alert and oriented to person, place, and time.   Psychiatric:         Behavior: Behavior normal.                Result Review :                                   Assessment and Plan      Diagnoses and all orders for this visit:    1. Cough, unspecified type (Primary)  Assessment & Plan:  FINDINGS:  Lungs are hyperinflated. A noncalcified nodule is present in the left apex. Scarring in the left apex has a similar appearance to 7/27/2020. No acute airspace disease is seen. Heart size is normal. No pleural effusion or pneumothorax. Moderate degenerative endplate spurring in the thoracic spine. Calcification overlying the left first rib costochondral junction, without significant change from 2020, may represent old healed fracture.     IMPRESSION:  No acute chest finding.    Orders:  -     POCT SARS-CoV-2 Antigen AARON  -     XR Chest 2 View    Other orders  -     levoFLOXacin (Levaquin) 500 MG tablet; Take 1 tablet by mouth Daily.  Dispense: 7 tablet; Refill: 0  -     methylPREDNISolone (MEDROL) 4 MG dose pack; Take as directed on package instructions.  Dispense: 21 each; Refill: 0  -     albuterol sulfate  (90 Base) MCG/ACT inhaler; Inhale 2 puffs Every 4 (Four) Hours As Needed for Wheezing.  Dispense: 18 g; Refill: 0    Treating patient for pneumonia considering he has been with this cough for about 2 months and is starting to develop chills with continuation of  2-month cough.  Chest x-ray obtained today and shows no acute processes.          Follow Up       No follow-ups on file.      Patient was given instructions and counseling regarding his condition or for health maintenance advice. Please see specific information pulled into the AVS if appropriate.     Loly Cooper, [id number]/15/[id number] EST  This note has been electronically signed

## 2023-01-03 DIAGNOSIS — M51.36 LUMBAR DEGENERATIVE DISC DISEASE: ICD-10-CM

## 2023-01-09 ENCOUNTER — OFFICE VISIT (OUTPATIENT)
Dept: FAMILY MEDICINE CLINIC | Facility: CLINIC | Age: 73
End: 2023-01-09
Payer: MEDICARE

## 2023-01-09 VITALS
RESPIRATION RATE: 16 BRPM | WEIGHT: 194.8 LBS | HEIGHT: 72 IN | DIASTOLIC BLOOD PRESSURE: 82 MMHG | HEART RATE: 73 BPM | BODY MASS INDEX: 26.38 KG/M2 | TEMPERATURE: 97.8 F | OXYGEN SATURATION: 98 % | SYSTOLIC BLOOD PRESSURE: 138 MMHG

## 2023-01-09 DIAGNOSIS — J47.9 BRONCHIECTASIS WITHOUT COMPLICATION: ICD-10-CM

## 2023-01-09 DIAGNOSIS — J44.9 STAGE 1 MILD COPD BY GOLD CLASSIFICATION: Primary | ICD-10-CM

## 2023-01-09 PROCEDURE — 99214 OFFICE O/P EST MOD 30 MIN: CPT | Performed by: FAMILY MEDICINE

## 2023-01-09 RX ORDER — ROFLUMILAST 500 UG/1
500 TABLET ORAL DAILY
Qty: 30 TABLET | Refills: 2 | Status: SHIPPED | OUTPATIENT
Start: 2023-01-09 | End: 2023-04-04

## 2023-01-09 RX ORDER — TIOTROPIUM BROMIDE INHALATION SPRAY 1.56 UG/1
2 SPRAY, METERED RESPIRATORY (INHALATION)
Qty: 4 G | Refills: 2 | Status: SHIPPED | OUTPATIENT
Start: 2023-01-09 | End: 2023-01-10 | Stop reason: SDUPTHER

## 2023-01-09 NOTE — PROGRESS NOTES
Subjective   Malvin Bustillo is a 72 y.o. male.   Chief Complaint   Patient presents with   • Cough       History of Present Illness   Presents to the office today with a complaint of a cough for 4 months.  Tells me that he was seen here in mid December and treated for pneumonia.  He was given steroids, antibiotics, inhaler.  Chest x-ray was done and apparently there was no pneumonia.  He has continued to have a productive cough, typically worst in the morning with very large amounts of thick sputum.  Sometimes he has to cough quite a bit before he can get the sputum out.  He denies any fevers or chills.  No real shortness of breath except when he is coughing.  He reports that once he gets all the stuff cleared up he exercises for over 30 minutes/day on his elliptical machine.    Notably, his lung doctor recently told him that he had mild COPD.      Patient Active Problem List    Diagnosis Date Noted   • Cough 12/15/2022   • JERONIMO on CPAP 09/26/2019     Note Last Updated: 9/26/2019     Dr. Nur     • Lumbar degenerative disc disease 08/06/2019     Note Last Updated: 8/6/2019     Imaged on abd series 2018     • Mixed hyperlipidemia 08/05/2019   • Sleep apnea 08/05/2019   • Constipation 10/01/2018   • Other dorsalgia 06/14/2018   • Benign prostatic hyperplasia 04/02/2018   • Degenerative joint disease of left knee 01/11/2018   • Benign essential hypertension 12/11/2017   • Knee pain, left 12/11/2017   • Seborrheic keratosis 12/11/2017   • Recurrent urinary tract infection 10/10/2017   • Adenomatous polyp of colon 06/20/2016   • Body mass index (BMI) of 25.0 to 29.9 12/21/2015   • Intermittent vertigo 12/05/2014   • Fasting hyperglycemia 12/18/2013   • Degenerative joint disease 01/01/1960           Past Surgical History:   Procedure Laterality Date   • COLONOSCOPY  2013 2013= TA/ 2018= NEG, Rech 2023   • HEMORRHOIDECTOMY     • INGUINAL HERNIA REPAIR Left    • ORCHIECTOMY Left     secondary due to hernia incarceration   •  TONSILLECTOMY AND ADENOIDECTOMY       Current Outpatient Medications on File Prior to Visit   Medication Sig   • Apoaequorin 10 MG capsule Take 1 tablet by mouth Daily.   • aspirin (aspirin) 81 MG EC tablet Take 1 tablet by mouth Daily.   • cholecalciferol (VITAMIN D3) 1000 units tablet Take 1 tablet by mouth Daily.   • diclofenac (VOLTAREN) 50 MG EC tablet TAKE 1 TABLET BY MOUTH TWICE DAILY AS NEEDED FOR JOINT PAIN   • docusate sodium (COLACE) 100 MG capsule Take 1 capsule by mouth 2 (Two) Times a Day.   • L-Lysine 500 MG tablet tablet Take 1 tablet by mouth Daily.   • lisinopril (PRINIVIL,ZESTRIL) 5 MG tablet Take 1 tablet by mouth once daily   • MULTIPLE VITAMIN PO Take 1 tablet by mouth Daily.   • rosuvastatin (CRESTOR) 5 MG tablet Take 1 tablet by mouth Daily.   • tamsulosin (FLOMAX) 0.4 MG capsule 24 hr capsule Take 1 capsule by mouth Daily.   • Zinc 50 MG tablet Take 1 tablet by mouth Daily.   • albuterol sulfate  (90 Base) MCG/ACT inhaler Inhale 2 puffs Every 4 (Four) Hours As Needed for Wheezing.   • [DISCONTINUED] levoFLOXacin (Levaquin) 500 MG tablet Take 1 tablet by mouth Daily.     No current facility-administered medications on file prior to visit.     Allergies   Allergen Reactions   • Lipitor [Atorvastatin Calcium] Myalgia     Social History     Socioeconomic History   • Marital status:    Tobacco Use   • Smoking status: Former     Packs/day: 2.00     Years: 10.00     Pack years: 20.00     Types: Cigarettes     Start date:      Quit date:      Years since quittin.0     Passive exposure: Past   • Smokeless tobacco: Never   Vaping Use   • Vaping Use: Never used   Substance and Sexual Activity   • Alcohol use: Yes     Alcohol/week: 8.0 standard drinks     Types: 8 Cans of beer per week   • Drug use: No   • Sexual activity: Defer     Family History   Problem Relation Age of Onset   • Hypertension Mother    • Heart disease Mother    • Depression Mother    • Osteoporosis Mother     • Skin cancer Mother    • Thyroid disease Mother    • Coronary artery disease Mother    • Heart disease Father    • Diabetes Father    • Coronary artery disease Father    • Hypertension Brother    • Heart disease Brother    • No Known Problems Sister        Review of Systems    Objective   /82 (BP Location: Left arm, Patient Position: Sitting, Cuff Size: Adult)   Pulse 73   Temp 97.8 °F (36.6 °C) (Infrared)   Resp 16   Ht 182.9 cm (72.01\")   Wt 88.4 kg (194 lb 12.8 oz)   SpO2 98%   BMI 26.41 kg/m²   Physical Exam  Constitutional:       Appearance: He is well-developed.      Comments: Wearing a face mask     HENT:      Head: Normocephalic and atraumatic.   Eyes:      Conjunctiva/sclera: Conjunctivae normal.   Cardiovascular:      Rate and Rhythm: Normal rate.   Pulmonary:      Effort: Accessory muscle usage present.      Breath sounds: Decreased breath sounds present. No wheezing, rhonchi or rales.   Musculoskeletal:         General: Normal range of motion.      Cervical back: Normal range of motion.   Skin:     General: Skin is warm and dry.      Findings: No rash.   Neurological:      Mental Status: He is alert and oriented to person, place, and time.   Psychiatric:         Behavior: Behavior normal.                 Assessment & Plan   Diagnoses and all orders for this visit:    1. Stage 1 mild COPD by GOLD classification ([facility]) (Primary)  -     CT Chest Without Contrast; Future    2. Bronchiectasis without complication ([facility])  -     CT Chest Without Contrast; Future    Other orders  -     Tiotropium Bromide Monohydrate (Spiriva Respimat) 1.25 MCG/ACT aerosol solution inhaler; Inhale 2 puffs Daily.  Dispense: 4 g; Refill: 2  -     roflumilast (Daliresp) 500 MCG tablet tablet; Take 1 tablet by mouth Daily.  Dispense: 30 tablet; Refill: 2    I reviewed the recent chest x-ray reports with Malvin.  He has some hyperexpansion.  Coupling that with the numbers on his spirometry from Dr. Nur's recent office  note, I suspect he has COPD with probably some bronchiectasis.  I would like to try to treat him with Spiriva and Daliresp to see if we can reduce the amount of secretions.  I have sent those prescriptions to the pharmacy.  We will see what is covered and go from there.  Because of the nature of the sputum I think he needs a CT of his chest to see if he has bronchiectasis.  I will follow-up with him when the reports are back.  He will keep previously scheduled follow-up appointment for reevaluation on February 27.        Call with any problems or concerns before next visit       Return if symptoms worsen or fail to improve.      Much of this report is an electronic transcription of spoken language to printed text using Dragon dictation software.  As such, the subtleties and finesse of spoken language may permit erroneous, or at times, nonsensical words or phrases to be inadvertently transcribed; thus changes may be made at a later date to rectify these errors.     Lidia Hirsch MD1/9/202317:10 EST  This note has been electronically signed

## 2023-01-10 ENCOUNTER — TELEPHONE (OUTPATIENT)
Dept: FAMILY MEDICINE CLINIC | Facility: CLINIC | Age: 73
End: 2023-01-10
Payer: MEDICARE

## 2023-01-10 RX ORDER — TIOTROPIUM BROMIDE INHALATION SPRAY 1.56 UG/1
2 SPRAY, METERED RESPIRATORY (INHALATION)
Qty: 4 G | Refills: 0 | COMMUNITY
Start: 2023-01-10 | End: 2023-01-18 | Stop reason: SDUPTHER

## 2023-01-10 NOTE — TELEPHONE ENCOUNTER
I found a sample of the Spiriva.  Its 2 puffs once a day.  The sample will enough for 2 weeks.  Let me know after he has used this for a week if it has made it easier for him to breathe, easier to cough up the secretions, or if it is decrease the secretions.  If it is beneficial, we will try to find something similar that is covered on his insurance.  Thanks!

## 2023-01-10 NOTE — TELEPHONE ENCOUNTER
Caller: Malvin Bustillo    Relationship: Self    Best call back number: [phone]  What is the best time to reach you: ANY     Who are you requesting to speak with (clinical staff, provider,  specific staff member): CLINICAL STAFF     What was the call regarding: PATIENT IS CALLING IN REGARDING Tiotropium Bromide Monohydrate (Spiriva Respimat) 1.25 MCG/ACT aerosol solution inhaler IT WILL .00.  IS THERE SOMETHING ELSE THAT WOULD BE EQUAL TO THAT.  THAT CAN BE CALLED IN FOR THE PATIENT.  HE IS ALSO IS WANTING TO KNOW IF THIS SOMETHING THAT WILL BE LONG TERM OR A ONE TIME USE   [facility] [address] [place] [address] [place] - [address] [facility] [phone]  - [phone] FX      Do you require a callback: YES

## 2023-01-18 ENCOUNTER — TELEPHONE (OUTPATIENT)
Dept: FAMILY MEDICINE CLINIC | Facility: CLINIC | Age: 73
End: 2023-01-18
Payer: MEDICARE

## 2023-01-18 RX ORDER — TIOTROPIUM BROMIDE INHALATION SPRAY 1.56 UG/1
2 SPRAY, METERED RESPIRATORY (INHALATION)
Qty: 4 G | Refills: 0 | COMMUNITY
Start: 2023-01-18 | End: 2023-01-20

## 2023-01-18 NOTE — TELEPHONE ENCOUNTER
DANIEL- I sent in a prescription for him for this and it was $500.  I gave him a sample to see if it was indeed helpful.  It seems like it was based on the report he has given us below.  His insurance will not tell us what they will cover.  Please give him another sample of the Spiriva and asked him to contact his insurance company.  He should ask them what inhalers they cover.  They need to tell him what his cost will be and if one is affordable, let me know what that is and if it is a good option for him I will send in a prescription for that.  Thanks!

## 2023-01-18 NOTE — TELEPHONE ENCOUNTER
Caller: Malvin Bustillo    Relationship: Self    Best call back number:     What is the best time to reach you:     Who are you requesting to speak with (clinical staff, provider,  specific staff member):     Do you know the name of the person who called:     What was the call regarding: PATIENT IS CALLING IN STATING THAT DR ERVIN STARTED HIM ON Tiotropium Bromide Monohydrate (Spiriva Respimat) 1.25 MCG/ACT aerosol solution inhaler  A WEEK AGO AND THE PATIENT WAS TO CALL IN WITH A UPDATE ON HOW IT IS DOING FOR HIM. PATIENT SAYS THAT HE IS NOT COUGHING UP STUFF AND HE IS NOT HAVING ISSUES TAKING A BREATH NOW.     Do you require a callback: IF NEEDED

## 2023-01-20 RX ORDER — ALBUTEROL SULFATE 90 UG/1
2 AEROSOL, METERED RESPIRATORY (INHALATION) 3 TIMES DAILY
Qty: 18 G | Refills: 5 | Status: SHIPPED | OUTPATIENT
Start: 2023-01-20

## 2023-01-28 ENCOUNTER — HOSPITAL ENCOUNTER (OUTPATIENT)
Dept: CT IMAGING | Facility: HOSPITAL | Age: 73
Discharge: HOME | End: 2023-01-28
Admitting: FAMILY MEDICINE
Payer: MEDICARE

## 2023-01-28 DIAGNOSIS — J44.9 STAGE 1 MILD COPD BY GOLD CLASSIFICATION: ICD-10-CM

## 2023-01-28 DIAGNOSIS — J47.9 BRONCHIECTASIS WITHOUT COMPLICATION: ICD-10-CM

## 2023-01-28 PROCEDURE — 71250 CT THORAX DX C-: CPT

## 2023-02-21 DIAGNOSIS — E78.2 MIXED HYPERLIPIDEMIA: ICD-10-CM

## 2023-02-21 RX ORDER — ROSUVASTATIN CALCIUM 5 MG/1
TABLET, COATED ORAL
Qty: 90 TABLET | Refills: 0 | Status: SHIPPED | OUTPATIENT
Start: 2023-02-21

## 2023-02-27 ENCOUNTER — OFFICE VISIT (OUTPATIENT)
Dept: FAMILY MEDICINE CLINIC | Facility: CLINIC | Age: 73
End: 2023-02-27
Payer: MEDICARE

## 2023-02-27 VITALS
TEMPERATURE: 97.7 F | HEART RATE: 80 BPM | HEIGHT: 72 IN | BODY MASS INDEX: 26.11 KG/M2 | SYSTOLIC BLOOD PRESSURE: 130 MMHG | OXYGEN SATURATION: 97 % | WEIGHT: 192.8 LBS | DIASTOLIC BLOOD PRESSURE: 60 MMHG | RESPIRATION RATE: 18 BRPM

## 2023-02-27 DIAGNOSIS — Z12.5 ENCOUNTER FOR PROSTATE CANCER SCREENING: ICD-10-CM

## 2023-02-27 DIAGNOSIS — J44.9 STAGE 1 MILD COPD BY GOLD CLASSIFICATION: Primary | ICD-10-CM

## 2023-02-27 DIAGNOSIS — J01.00 ACUTE NON-RECURRENT MAXILLARY SINUSITIS: ICD-10-CM

## 2023-02-27 DIAGNOSIS — I10 BENIGN ESSENTIAL HYPERTENSION: ICD-10-CM

## 2023-02-27 DIAGNOSIS — J47.9 BRONCHIECTASIS WITHOUT COMPLICATION: ICD-10-CM

## 2023-02-27 DIAGNOSIS — E04.1 THYROID NODULE GREATER THAN OR EQUAL TO 1 CM IN DIAMETER INCIDENTALLY NOTED ON IMAGING STUDY: ICD-10-CM

## 2023-02-27 DIAGNOSIS — E78.2 MIXED HYPERLIPIDEMIA: ICD-10-CM

## 2023-02-27 DIAGNOSIS — Z11.59 NEED FOR HEPATITIS C SCREENING TEST: ICD-10-CM

## 2023-02-27 PROCEDURE — 99214 OFFICE O/P EST MOD 30 MIN: CPT | Performed by: FAMILY MEDICINE

## 2023-02-27 RX ORDER — AMOXICILLIN AND CLAVULANATE POTASSIUM 875; 125 MG/1; MG/1
1 TABLET, FILM COATED ORAL 2 TIMES DAILY
Qty: 14 TABLET | Refills: 0 | Status: SHIPPED | OUTPATIENT
Start: 2023-02-27 | End: 2023-03-06

## 2023-02-27 NOTE — PROGRESS NOTES
Subjective   Malvin Bustillo is a 73 y.o. male.   Chief Complaint   Patient presents with   • Hypertension   • Hyperlipidemia   • Benign Prostatic Hypertrophy   • COPD       History of Present Illness   Presents to the office today for planned follow-up on multiple medical problems.  Last lab studies were 6 to 8 months ago.  Included a CBC, CMP, lipid panel, PSA.  Everything was reasonable.  Blood pressure today is 130/60.  Brings a list of numbers from home.  Blood pressures seem to be running below 1 10 in the morning and in the 130s in the evening.    He has obstructive sleep apnea and sees Dr. Nur.  He is on CPAP.  He was having shortness of breath.  I looked at the PFTs from Dr. Nur.  Combining that with his symptoms and findings on x-rays he has mild COPD.  I did a CT of his chest which did show mild central bronchiectasis in both lungs and small 2 to 3 mm noncalcified bilateral pulmonary nodules.  He has some interstitial fibrotic change and chronic biapical scarring.  He also had granulomatous changes in the left upper lobe.  We have been working to find some type of inhaler that was affordable.  As of our last visit I wanted to put him on Spiriva and Daliresp, but the Spiriva was too expensive.  He provided a list of medications that were covered on his insurance and nothing was a tier 1.    His insurance says it will cover Ventolin, but not generic albuterol.  He is not having a lot of shortness of breath at rest.  Minimal shortness of breath with exertion.  He does note that since he has been on the Daliresp, he has had less cough productive of phlegm.    He does have some frontal sinus pressure today.  This is similar to other sinus infections he has had.      Patient Active Problem List    Diagnosis Date Noted   • Cough 12/15/2022   • JERONIMO on CPAP 09/26/2019     Note Last Updated: 9/26/2019     Dr. Nur     • Lumbar degenerative disc disease 08/06/2019     Note Last Updated: 8/6/2019     Imaged on abd  series 2018     • Mixed hyperlipidemia 08/05/2019   • Sleep apnea 08/05/2019   • Constipation 10/01/2018   • Other dorsalgia 06/14/2018   • Benign prostatic hyperplasia 04/02/2018   • Degenerative joint disease of left knee 01/11/2018   • Benign essential hypertension 12/11/2017   • Knee pain, left 12/11/2017   • Seborrheic keratosis 12/11/2017   • Recurrent urinary tract infection 10/10/2017   • Adenomatous polyp of colon 06/20/2016   • Body mass index (BMI) of 25.0 to 29.9 12/21/2015   • Intermittent vertigo 12/05/2014   • Fasting hyperglycemia 12/18/2013   • Degenerative joint disease 01/01/1960           Past Surgical History:   Procedure Laterality Date   • COLONOSCOPY  2013 2013= TA/ 2018= NEG, Rech 2023   • HEMORRHOIDECTOMY     • INGUINAL HERNIA REPAIR Left    • ORCHIECTOMY Left     secondary due to hernia incarceration   • TONSILLECTOMY AND ADENOIDECTOMY       Current Outpatient Medications on File Prior to Visit   Medication Sig   • albuterol sulfate HFA (Ventolin HFA) 108 (90 Base) MCG/ACT inhaler Inhale 2 puffs 3 (Three) Times a Day.   • Apoaequorin 10 MG capsule Take 1 tablet by mouth Daily.   • aspirin (aspirin) 81 MG EC tablet Take 1 tablet by mouth Daily.   • cholecalciferol (VITAMIN D3) 1000 units tablet Take 1 tablet by mouth Daily.   • diclofenac (VOLTAREN) 50 MG EC tablet TAKE 1 TABLET BY MOUTH TWICE DAILY AS NEEDED FOR JOINT PAIN   • docusate sodium (COLACE) 100 MG capsule Take 1 capsule by mouth 2 (Two) Times a Day.   • L-Lysine 500 MG tablet tablet Take 1 tablet by mouth Daily.   • lisinopril (PRINIVIL,ZESTRIL) 5 MG tablet Take 1 tablet by mouth once daily   • MULTIPLE VITAMIN PO Take 1 tablet by mouth Daily.   • roflumilast (Daliresp) 500 MCG tablet tablet Take 1 tablet by mouth Daily.   • rosuvastatin (CRESTOR) 5 MG tablet Take 1 tablet by mouth once daily   • tamsulosin (FLOMAX) 0.4 MG capsule 24 hr capsule Take 1 capsule by mouth Daily.   • Zinc 50 MG tablet Take 1 tablet by mouth Daily.  "    No current facility-administered medications on file prior to visit.     Allergies   Allergen Reactions   • Lipitor [Atorvastatin Calcium] Myalgia     Social History     Socioeconomic History   • Marital status:    Tobacco Use   • Smoking status: Former     Packs/day: 2.00     Years: 10.00     Pack years: 20.00     Types: Cigarettes     Start date:      Quit date:      Years since quittin.1     Passive exposure: Past   • Smokeless tobacco: Never   Vaping Use   • Vaping Use: Never used   Substance and Sexual Activity   • Alcohol use: Yes     Alcohol/week: 8.0 standard drinks     Types: 8 Cans of beer per week   • Drug use: No   • Sexual activity: Yes     Partners: Female     Family History   Problem Relation Age of Onset   • Hypertension Mother    • Heart disease Mother    • Depression Mother    • Osteoporosis Mother    • Skin cancer Mother    • Thyroid disease Mother    • Coronary artery disease Mother    • Heart disease Father    • Diabetes Father    • Coronary artery disease Father    • Hypertension Brother    • Heart disease Brother    • No Known Problems Sister        Review of Systems    Objective   /60 (BP Location: Right arm, Patient Position: Sitting, Cuff Size: Adult)   Pulse 80   Temp 97.7 °F (36.5 °C) (Infrared)   Resp 18   Ht 182.9 cm (72.01\")   Wt 87.5 kg (192 lb 12.8 oz)   SpO2 97%   BMI 26.14 kg/m²   Physical Exam  Constitutional:       Appearance: He is well-developed. He is not toxic-appearing.      Comments: Wearing a face mask     HENT:      Head: Normocephalic and atraumatic.      Nose: Congestion present.      Comments: Little tenderness over both maxillary sinuses.  Eyes:      Conjunctiva/sclera: Conjunctivae normal.   Cardiovascular:      Rate and Rhythm: Normal rate and regular rhythm.      Heart sounds: No murmur heard.  Pulmonary:      Effort: Pulmonary effort is normal.      Breath sounds: Decreased breath sounds present. No wheezing, rhonchi or rales. "   Musculoskeletal:         General: Normal range of motion.      Cervical back: Normal range of motion.      Right lower leg: No edema.      Left lower leg: No edema.   Skin:     General: Skin is warm and dry.      Findings: No rash.   Neurological:      Mental Status: He is alert and oriented to person, place, and time.   Psychiatric:         Mood and Affect: Mood normal.         Behavior: Behavior normal.           Office Visit on 12/15/2022   Component Date Value Ref Range Status   • SARS Antigen 12/15/2022 Not Detected  Not Detected, Presumptive Negative Final   • Influenza A Antigen AARON 12/15/2022 Not Detected  Not Detected Final   • Influenza B Antigen AARON 12/15/2022 Not Detected  Not Detected Final   • Internal Control 12/15/2022 Passed  Passed Final   • Lot Number 12/15/2022 [id number]   Final   • Expiration Date 12/15/2022 [id number]   Final         Assessment & Plan   Diagnoses and all orders for this visit:    1. Stage 1 mild COPD by GOLD classification ([facility]) (Primary)    2. Bronchiectasis without complication ([facility])    3. Mixed hyperlipidemia  -     Lipid Panel; Future    4. Benign essential hypertension  -     Comprehensive Metabolic Panel; Future  -     CBC & Differential; Future    5. Encounter for prostate cancer screening  -     PSA Screen; Future    6. Acute non-recurrent maxillary sinusitis  -     amoxicillin-clavulanate (Augmentin) 875-125 MG per tablet; Take 1 tablet by mouth 2 (Two) Times a Day for 7 days.  Dispense: 14 tablet; Refill: 0    7. Thyroid nodule greater than or equal to 1 cm in diameter incidentally noted on imaging study  -     US Thyroid; Future    8. Need for hepatitis C screening test  -     Hepatitis C Antibody; Future    Significant number of problems discussed today.  He has COPD-mild per PFTs.  He is minimally symptomatic.  He will use as needed Ventolin per his insurance.  Blood pressure control is excellent.  Continue lisinopril 5 mg/day.  We discussed Daliresp and its  use.  We will treat what appears to be a mild maxillary sinusitis with Augmentin.  We reviewed the CT of his chest that showed the bronchiectasis.  He also had an incidental thyroid nodule.  We are going to work that up with an ultrasound of the thyroid gland.  We will see him back in around 5 months with labs a few days before.  That will be 1 year since his last labs.  He also wants to get the PSA done as he will be seeing his urologist in late July.  Have added other labs that are appropriate to be done annually as well.  He asks when he should get a shingles vaccine.  Anytime is a good time.  We also talked about when the best time of day to exercise was.  He likes to exercise in the morning and I think that is great.        Call with any problems or concerns before next visit       Return in about 5 months (around 7/27/2023), or With labs sometime after July 17.      Much of this report is an electronic transcription of spoken language to printed text using Dragon dictation software.  As such, the subtleties and finesse of spoken language may permit erroneous, or at times, nonsensical words or phrases to be inadvertently transcribed; thus changes may be made at a later date to rectify these errors.     Lidia Hirsch MD2/27/202309:02 EST  This note has been electronically signed

## 2023-03-07 ENCOUNTER — HOSPITAL ENCOUNTER (OUTPATIENT)
Dept: ULTRASOUND IMAGING | Facility: HOSPITAL | Age: 73
Discharge: HOME | End: 2023-03-07
Admitting: FAMILY MEDICINE
Payer: MEDICARE

## 2023-03-07 DIAGNOSIS — E04.1 THYROID NODULE GREATER THAN OR EQUAL TO 1 CM IN DIAMETER INCIDENTALLY NOTED ON IMAGING STUDY: ICD-10-CM

## 2023-03-07 PROCEDURE — 76536 US EXAM OF HEAD AND NECK: CPT

## 2023-03-12 PROBLEM — E04.1 NODULE OF RIGHT LOBE OF THYROID GLAND: Status: ACTIVE | Noted: 2023-03-12

## 2023-03-13 ENCOUNTER — TELEPHONE (OUTPATIENT)
Dept: FAMILY MEDICINE CLINIC | Facility: CLINIC | Age: 73
End: 2023-03-13
Payer: MEDICARE

## 2023-03-13 DIAGNOSIS — E04.1 RIGHT THYROID NODULE: Primary | ICD-10-CM

## 2023-03-13 NOTE — TELEPHONE ENCOUNTER
Dr obregon, after doing some research with radiology, I found the code, we do not have access to order it, I will call epic to have it added to your pref list, however radiology placed order, please co sign orders under your co sign order tab and he will get scheduled. thanks

## 2023-03-13 NOTE — TELEPHONE ENCOUNTER
I have put in the order for thyroid ultrasound.  Thanks for your help on this.  If they call back wanting more, let me know what I need to do.  Thanks!

## 2023-03-13 NOTE — TELEPHONE ENCOUNTER
----- Message from Lupis Gonzalez MA sent at 3/13/2023  2:50 PM EDT -----  Regarding: FW: Thyroid biopsy  Contact: [phone]  I asked angi what they needed she states that we just need to order the us for fna and they will do everything else.   ----- Message -----  From: Lidia Hirsch MD  Sent: 3/13/2023   2:25 PM EDT  To: Lupis Gonzalez MA  Subject: FW: Thyroid biopsy                               Will you please call down to the [facility] radiology department.  I have a patient who has a large thyroid nodule and needs an FNA done.  Please ask them how I order this.  I know how to order a fine-needle aspiration, but I do not know the workflow.  Do I need to make a consult to the radiologist somehow?  Or just put in the order for the FNA?  Thanks!  ----- Message -----  From: Lupis Gonzalez MA  Sent: 3/13/2023   8:26 AM EDT  To: Lidia Hirsch MD  Subject: FW: Thyroid biopsy                                 ----- Message -----  From: Angi Abdul  Sent: 3/13/2023   7:53 AM EDT  To: Lupis Gonzalez MA  Subject: FW: Thyroid biopsy                                 ----- Message -----  From: Malvin Bustillo  Sent: 3/12/2023   1:47 PM EDT  To: [facility]  Subject: Thyroid biopsy                                   I agree with your recommendation to proceed with a biopsy of my thyroid nodule to be done at [facility].  Thank you.    Malvin Bustillo

## 2023-03-30 ENCOUNTER — HOSPITAL ENCOUNTER (OUTPATIENT)
Dept: ULTRASOUND IMAGING | Facility: HOSPITAL | Age: 73
Discharge: HOME | End: 2023-03-30
Admitting: FAMILY MEDICINE
Payer: MEDICARE

## 2023-03-30 DIAGNOSIS — E04.1 THYROID NODULE: ICD-10-CM

## 2023-03-30 PROCEDURE — 88173 CYTOPATH EVAL FNA REPORT: CPT | Performed by: FAMILY MEDICINE

## 2023-03-30 PROCEDURE — 88305 TISSUE EXAM BY PATHOLOGIST: CPT | Performed by: FAMILY MEDICINE

## 2023-03-30 PROCEDURE — 0 LIDOCAINE 1 % SOLUTION: Performed by: FAMILY MEDICINE

## 2023-03-30 RX ORDER — LIDOCAINE HYDROCHLORIDE 10 MG/ML
5 INJECTION, SOLUTION INFILTRATION; PERINEURAL ONCE
Status: COMPLETED | OUTPATIENT
Start: 2023-03-30 | End: 2023-03-30

## 2023-03-30 RX ADMIN — Medication 5 ML: at 12:53

## 2023-03-31 LAB
LAB AP CASE REPORT: NORMAL
PATH REPORT.FINAL DX SPEC: NORMAL
PATH REPORT.GROSS SPEC: NORMAL

## 2023-04-04 RX ORDER — ROFLUMILAST 500 UG/1
TABLET ORAL
Qty: 30 TABLET | Refills: 0 | Status: SHIPPED | OUTPATIENT
Start: 2023-04-04

## 2023-04-07 DIAGNOSIS — M51.36 LUMBAR DEGENERATIVE DISC DISEASE: ICD-10-CM

## 2023-05-05 RX ORDER — ROFLUMILAST 500 UG/1
TABLET ORAL
Qty: 90 TABLET | Refills: 3 | Status: SHIPPED | OUTPATIENT
Start: 2023-05-05

## 2023-05-22 DIAGNOSIS — E78.2 MIXED HYPERLIPIDEMIA: ICD-10-CM

## 2023-05-22 RX ORDER — ROSUVASTATIN CALCIUM 5 MG/1
TABLET, COATED ORAL
Qty: 90 TABLET | Refills: 3 | Status: SHIPPED | OUTPATIENT
Start: 2023-05-22

## 2023-07-24 ENCOUNTER — OFFICE VISIT (OUTPATIENT)
Dept: FAMILY MEDICINE CLINIC | Facility: CLINIC | Age: 73
End: 2023-07-24
Payer: MEDICARE

## 2023-07-24 VITALS
TEMPERATURE: 97.5 F | HEART RATE: 72 BPM | WEIGHT: 183 LBS | OXYGEN SATURATION: 97 % | DIASTOLIC BLOOD PRESSURE: 59 MMHG | BODY MASS INDEX: 24.79 KG/M2 | HEIGHT: 72 IN | SYSTOLIC BLOOD PRESSURE: 116 MMHG

## 2023-07-24 DIAGNOSIS — I10 BENIGN ESSENTIAL HYPERTENSION: ICD-10-CM

## 2023-07-24 DIAGNOSIS — R63.4 WEIGHT LOSS, UNINTENTIONAL: Primary | ICD-10-CM

## 2023-07-24 DIAGNOSIS — J44.9 STAGE 1 MILD COPD BY GOLD CLASSIFICATION: ICD-10-CM

## 2023-07-24 DIAGNOSIS — J47.9 BRONCHIECTASIS WITHOUT COMPLICATION: ICD-10-CM

## 2023-07-24 DIAGNOSIS — E78.2 MIXED HYPERLIPIDEMIA: ICD-10-CM

## 2023-07-24 DIAGNOSIS — E04.1 RIGHT THYROID NODULE: ICD-10-CM

## 2023-07-24 DIAGNOSIS — N40.0 BENIGN PROSTATIC HYPERPLASIA WITHOUT LOWER URINARY TRACT SYMPTOMS: ICD-10-CM

## 2023-07-24 PROCEDURE — 3078F DIAST BP <80 MM HG: CPT | Performed by: FAMILY MEDICINE

## 2023-07-24 PROCEDURE — 1159F MED LIST DOCD IN RCRD: CPT | Performed by: FAMILY MEDICINE

## 2023-07-24 PROCEDURE — 3074F SYST BP LT 130 MM HG: CPT | Performed by: FAMILY MEDICINE

## 2023-07-24 PROCEDURE — 99214 OFFICE O/P EST MOD 30 MIN: CPT | Performed by: FAMILY MEDICINE

## 2023-07-24 PROCEDURE — 1160F RVW MEDS BY RX/DR IN RCRD: CPT | Performed by: FAMILY MEDICINE

## 2023-07-24 RX ORDER — MEGESTROL ACETATE 40 MG/ML
400 SUSPENSION ORAL DAILY
Qty: 480 ML | Refills: 1 | Status: SHIPPED | OUTPATIENT
Start: 2023-07-24

## 2023-07-24 RX ORDER — PREDNISONE 20 MG/1
TABLET ORAL
Qty: 19 TABLET | Refills: 0 | Status: SHIPPED | OUTPATIENT
Start: 2023-07-24

## 2023-07-24 NOTE — PROGRESS NOTES
Subjective   Malvin Bustillo is a 73 y.o. male.   Chief Complaint   Patient presents with    Hypertension       History of Present Illness   Presents to the office today for general follow-up on multiple conditions.  He had lab work done just a few days ago.    COPD with bronchiectasis-also follows with Dr. Nur.  Started coughing up more white phlegm recently.  No fevers, no color to the mucus.  Does not feel sick.    Does complain of weight gain.  Tells me that he has lost 40 pounds since he retired around 10 years ago.  On our chart he has lost 17 pounds over the last 7 months.  He is not trying.  Still eats 3 meals a day, but does not eat as much as he used to.  Activity level is the same.  He is also feeling more fatigued.  His legs feel weaker than usual.    He had a colonoscopy 5 years ago and is scheduled to have another one within the next couple months.    We did labs a few days ago.  I reviewed all these with him as below.  PSA was good, lipid panel was excellent.  Blood sugar was normal.  Renal function was normal.  He was not anemic.    He had a fine-needle aspiration of a thyroid nodule 4 months ago and that was okay.        Patient Active Problem List    Diagnosis Date Noted    Nodule of right lobe of thyroid gland 03/12/2023     Note Last Updated: 3/12/2023     On U/S -moderately suspicious.  Incidentally identified on CT scan of the chest.      Cough 12/15/2022    JERONIMO on CPAP 09/26/2019     Note Last Updated: 9/26/2019     Dr. Nur      Lumbar degenerative disc disease 08/06/2019     Note Last Updated: 8/6/2019     Imaged on abd series 2018      Mixed hyperlipidemia 08/05/2019    Sleep apnea 08/05/2019    Constipation 10/01/2018    Other dorsalgia 06/14/2018    Benign prostatic hyperplasia 04/02/2018    Degenerative joint disease of left knee 01/11/2018    Benign essential hypertension 12/11/2017    Knee pain, left 12/11/2017    Seborrheic keratosis 12/11/2017    Recurrent urinary tract infection  10/10/2017    Adenomatous polyp of colon 06/20/2016    Body mass index (BMI) of 25.0 to 29.9 12/21/2015    Intermittent vertigo 12/05/2014    Fasting hyperglycemia 12/18/2013    Degenerative joint disease 01/01/1960           Past Surgical History:   Procedure Laterality Date    COLONOSCOPY  2013 2013= TA/ 2018= NEG, Rech 2023    HEMORRHOIDECTOMY      INGUINAL HERNIA REPAIR Left     ORCHIECTOMY Left     secondary due to hernia incarceration    TONSILLECTOMY AND ADENOIDECTOMY       Current Outpatient Medications on File Prior to Visit   Medication Sig    albuterol sulfate HFA (Ventolin HFA) 108 (90 Base) MCG/ACT inhaler Inhale 2 puffs 3 (Three) Times a Day.    Apoaequorin 10 MG capsule Take 1 tablet by mouth Daily.    aspirin (aspirin) 81 MG EC tablet Take 1 tablet by mouth Daily.    cholecalciferol (VITAMIN D3) 1000 units tablet Take 1 tablet by mouth Daily.    diclofenac (VOLTAREN) 50 MG EC tablet TAKE 1 TABLET BY MOUTH TWICE DAILY AS NEEDED FOR JOINT PAIN    docusate sodium (COLACE) 100 MG capsule Take 1 capsule by mouth 2 (Two) Times a Day.    L-Lysine 500 MG tablet tablet Take 1 tablet by mouth Daily.    lisinopril (PRINIVIL,ZESTRIL) 5 MG tablet Take 1 tablet by mouth once daily    MULTIPLE VITAMIN PO Take 1 tablet by mouth Daily.    roflumilast (DALIRESP) 500 MCG tablet tablet Take 1 tablet by mouth once daily    rosuvastatin (CRESTOR) 5 MG tablet Take 1 tablet by mouth once daily    tamsulosin (FLOMAX) 0.4 MG capsule 24 hr capsule Take 1 capsule by mouth Daily.    Zinc 50 MG tablet Take 1 tablet by mouth Daily.     No current facility-administered medications on file prior to visit.     Allergies   Allergen Reactions    Lipitor [Atorvastatin Calcium] Myalgia     Social History     Socioeconomic History    Marital status:    Tobacco Use    Smoking status: Former     Packs/day: 2.00     Years: 10.00     Pack years: 20.00     Types: Cigarettes     Start date: 1986     Quit date: 1996     Years since  "quittin.5     Passive exposure: Past    Smokeless tobacco: Never   Vaping Use    Vaping Use: Never used   Substance and Sexual Activity    Alcohol use: Yes     Alcohol/week: 8.0 standard drinks     Types: 8 Cans of beer per week    Drug use: No    Sexual activity: Yes     Partners: Female     Family History   Problem Relation Age of Onset    Hypertension Mother     Heart disease Mother     Depression Mother     Osteoporosis Mother     Skin cancer Mother     Thyroid disease Mother     Coronary artery disease Mother     Heart disease Father     Diabetes Father     Coronary artery disease Father     Hypertension Brother     Heart disease Brother     No Known Problems Sister        Review of Systems    Objective   /59 (BP Location: Right arm, Patient Position: Sitting, Cuff Size: Adult)   Pulse 72   Temp 97.5 °F (36.4 °C) (Temporal)   Ht 182.9 cm (72\")   Wt 83 kg (183 lb)   SpO2 97%   BMI 24.82 kg/m²   Physical Exam  Constitutional:       Appearance: He is well-developed. He is not toxic-appearing.   HENT:      Head: Normocephalic and atraumatic.   Eyes:      Conjunctiva/sclera: Conjunctivae normal.   Cardiovascular:      Rate and Rhythm: Normal rate and regular rhythm.      Heart sounds: No murmur heard.  Pulmonary:      Effort: Pulmonary effort is normal. No respiratory distress.      Breath sounds: Normal breath sounds.      Comments: Slight barrel chested appearance, does use accessory muscles of respiration to inhale.  Musculoskeletal:         General: Normal range of motion.      Cervical back: Normal range of motion.      Right lower leg: No edema.      Left lower leg: No edema.   Skin:     General: Skin is warm and dry.      Findings: No rash.   Neurological:      General: No focal deficit present.      Mental Status: He is alert and oriented to person, place, and time.      Gait: Gait normal.   Psychiatric:         Mood and Affect: Mood normal.         Behavior: Behavior normal.         Results " Encounter on 07/17/2023   Component Date Value Ref Range Status    PSA 07/17/2023 2.3  0.0 - 4.0 ng/mL Final    Comment: Roche ECLIA methodology.  According to the American Urological Association, Serum PSA should  decrease and remain at undetectable levels after radical  prostatectomy. The AUA defines biochemical recurrence as an initial  PSA value 0.2 ng/mL or greater followed by a subsequent confirmatory  PSA value 0.2 ng/mL or greater.  Values obtained with different assay methods or kits cannot be used  interchangeably. Results cannot be interpreted as absolute evidence  of the presence or absence of malignant disease.      Total Cholesterol 07/17/2023 141  100 - 199 mg/dL Final    Triglycerides 07/17/2023 68  0 - 149 mg/dL Final    HDL Cholesterol 07/17/2023 48  >39 mg/dL Final    VLDL Cholesterol [place] 07/17/2023 14  5 - 40 mg/dL Final    LDL Chol Calc (NIH) 07/17/2023 79  0 - 99 mg/dL Final    Glucose 07/17/2023 90  70 - 99 mg/dL Final    BUN 07/17/2023 20  8 - 27 mg/dL Final    Creatinine 07/17/2023 1.07  0.76 - 1.27 mg/dL Final    EGFR Result 07/17/2023 74  >59 mL/min/1.73 Final    BUN/Creatinine Ratio 07/17/2023 19  10 - 24 Final    Sodium 07/17/2023 142  134 - 144 mmol/L Final    Potassium 07/17/2023 5.1  3.5 - 5.2 mmol/L Final    Chloride 07/17/2023 107 (H)  96 - 106 mmol/L Final    Total CO2 07/17/2023 23  20 - 29 mmol/L Final    Calcium 07/17/2023 9.8  8.6 - 10.2 mg/dL Final    Total Protein 07/17/2023 6.7  6.0 - 8.5 g/dL Final    Albumin 07/17/2023 4.5  3.8 - 4.8 g/dL Final                  **Please note reference interval change**    Globulin 07/17/2023 2.2  1.5 - 4.5 g/dL Final    A/G Ratio 07/17/2023 2.0  1.2 - 2.2 Final    Total Bilirubin 07/17/2023 0.7  0.0 - 1.2 mg/dL Final    Alkaline Phosphatase 07/17/2023 86  44 - 121 IU/L Final    AST (SGOT) 07/17/2023 18  0 - 40 IU/L Final    ALT (SGPT) 07/17/2023 28  0 - 44 IU/L Final    WBC 07/17/2023 5.0  3.4 - 10.8 x10E3/uL Final    RBC 07/17/2023 4.74   4.14 - 5.80 x10E6/uL Final    Hemoglobin 07/17/2023 14.6  13.0 - 17.7 g/dL Final    Hematocrit 07/17/2023 43.6  37.5 - 51.0 % Final    MCV 07/17/2023 92  79 - 97 fL Final    MCH 07/17/2023 30.8  26.6 - 33.0 pg Final    MCHC 07/17/2023 33.5  31.5 - 35.7 g/dL Final    RDW 07/17/2023 11.8  11.6 - 15.4 % Final    Platelets 07/17/2023 216  150 - 450 x10E3/uL Final    Neutrophil Rel % 07/17/2023 65  Not Estab. % Final    Lymphocyte Rel % 07/17/2023 24  Not Estab. % Final    Monocyte Rel % 07/17/2023 8  Not Estab. % Final    Eosinophil Rel % 07/17/2023 2  Not Estab. % Final    Basophil Rel % 07/17/2023 1  Not Estab. % Final    Neutrophils Absolute 07/17/2023 3.3  1.4 - 7.0 x10E3/uL Final    Lymphocytes Absolute 07/17/2023 1.2  0.7 - 3.1 x10E3/uL Final    Monocytes Absolute 07/17/2023 0.4  0.1 - 0.9 x10E3/uL Final    Eosinophils Absolute 07/17/2023 0.1  0.0 - 0.4 x10E3/uL Final    Basophils Absolute 07/17/2023 0.0  0.0 - 0.2 x10E3/uL Final    Immature Granulocyte Rel % 07/17/2023 0  Not Estab. % Final    Immature Grans Absolute 07/17/2023 0.0  0.0 - 0.1 x10E3/uL Final    Hep C Virus Ab 07/17/2023 Non Reactive  Non Reactive Final    Comment: HCV antibody alone does not differentiate between previously  resolved infection and active infection. Equivocal and Reactive  HCV antibody results should be followed up with an HCV RNA test  to support the diagnosis of active HCV infection.     Hospital Outpatient Visit on 03/30/2023   Component Date Value Ref Range Status    Case Report 03/30/2023    Final                    Value:Medical Cytology Report                           Case: [id number]                                  Authorizing Provider:  Eddie Joseph MD        Collected:           03/30/2023 01:05 PM          Ordering Location:     [facility] Received:            03/30/2023 01:26 PM                                 SOUND                                                                        Pathologist:            Varinder Flynn MD                                                            Specimen:    Thyroid, right thyroid, 2.4 cm nodule, Dr Joseph, 4 passes                                 Final Diagnosis 03/30/2023    Final                    Value:This result contains rich text formatting which cannot be displayed here.    Gross Description 03/30/2023    Final                    Value:This result contains rich text formatting which cannot be displayed here.         Assessment & Plan   Diagnoses and all orders for this visit:    1. Weight loss, unintentional (Primary)  -     megestrol (MEGACE) 40 MG/ML suspension; Take 10 mL by mouth Daily.  Dispense: 480 mL; Refill: 1    2. Benign essential hypertension    3. Mixed hyperlipidemia    4. Right thyroid nodule    5. Stage 1 mild COPD by GOLD classification    6. Bronchiectasis without complication    7. Benign prostatic hyperplasia without lower urinary tract symptoms    Other orders  -     predniSONE (DELTASONE) 20 MG tablet; 3 per day x 3 days. 2 per day x 3 days, 1 per day x 2 days, then 1/2 per day x 4 day  Dispense: 19 tablet; Refill: 0    Multiple medical problems reviewed today as above.  Blood pressure log from home reviewed today.  Blood pressure control is excellent.  Predominantly in the 120s.  Sometimes a little above that, sometimes a little below that.  No change in antihypertensive treatment.  Cholesterol levels are excellent.  Continue Crestor 5 mg/day.  Right thyroid nodule biopsy was negative.  We are going to treat with prednisone taper due to the bronchiectasis exacerbation.  He has Daliresp and is taking that daily.  I am concerned that his weight loss is pulmonary in nature.  Also the weakness and fatigue is probably related to weight loss.  We will do a 1 month trial of Megace and follow-up with him here again to see if he has gained weight.  If he is also feeling better and stronger, we can continue to use that periodically.  If not gaining  weight and feeling stronger, may need additional work-up.          Call with any problems or concerns before next visit       Return in about 4 weeks (around 8/21/2023).      Much of this report is an electronic transcription of spoken language to printed text using Dragon dictation software.  As such, the subtleties and finesse of spoken language may permit erroneous, or at times, nonsensical words or phrases to be inadvertently transcribed; thus changes may be made at a later date to rectify these errors.     Lidia Hirsch [id number]/202309:[id number] EDT  This note has been electronically signed

## 2023-08-25 ENCOUNTER — OFFICE VISIT (OUTPATIENT)
Dept: FAMILY MEDICINE CLINIC | Facility: CLINIC | Age: 73
End: 2023-08-25
Payer: MEDICARE

## 2023-08-25 VITALS
HEIGHT: 72 IN | DIASTOLIC BLOOD PRESSURE: 69 MMHG | OXYGEN SATURATION: 97 % | WEIGHT: 183 LBS | SYSTOLIC BLOOD PRESSURE: 127 MMHG | HEART RATE: 79 BPM | TEMPERATURE: 97.7 F | BODY MASS INDEX: 24.79 KG/M2

## 2023-08-25 DIAGNOSIS — N20.0 NEPHROLITHIASIS: ICD-10-CM

## 2023-08-25 DIAGNOSIS — J47.9 BRONCHIECTASIS WITHOUT COMPLICATION: ICD-10-CM

## 2023-08-25 DIAGNOSIS — R63.4 WEIGHT LOSS, UNINTENTIONAL: Primary | ICD-10-CM

## 2023-08-25 DIAGNOSIS — J30.1 SEASONAL ALLERGIC RHINITIS DUE TO POLLEN: ICD-10-CM

## 2023-08-25 DIAGNOSIS — J44.9 STAGE 1 MILD COPD BY GOLD CLASSIFICATION: ICD-10-CM

## 2023-08-25 DIAGNOSIS — I10 BENIGN ESSENTIAL HYPERTENSION: ICD-10-CM

## 2023-08-25 DIAGNOSIS — R39.198 DIFFICULTY URINATING: ICD-10-CM

## 2023-08-25 LAB
BILIRUB BLD-MCNC: NEGATIVE MG/DL
CLARITY, POC: CLEAR
COLOR UR: YELLOW
EXPIRATION DATE: ABNORMAL
GLUCOSE UR STRIP-MCNC: NEGATIVE MG/DL
KETONES UR QL: NEGATIVE
LEUKOCYTE EST, POC: NEGATIVE
Lab: ABNORMAL
NITRITE UR-MCNC: NEGATIVE MG/ML
PH UR: 7.5 [PH] (ref 5–8)
PROT UR STRIP-MCNC: NEGATIVE MG/DL
RBC # UR STRIP: ABNORMAL /UL
SP GR UR: 1.01 (ref 1–1.03)
UROBILINOGEN UR QL: NORMAL

## 2023-08-25 PROCEDURE — 3074F SYST BP LT 130 MM HG: CPT | Performed by: FAMILY MEDICINE

## 2023-08-25 PROCEDURE — 3078F DIAST BP <80 MM HG: CPT | Performed by: FAMILY MEDICINE

## 2023-08-25 PROCEDURE — 99214 OFFICE O/P EST MOD 30 MIN: CPT | Performed by: FAMILY MEDICINE

## 2023-08-25 PROCEDURE — 1159F MED LIST DOCD IN RCRD: CPT | Performed by: FAMILY MEDICINE

## 2023-08-25 PROCEDURE — 1160F RVW MEDS BY RX/DR IN RCRD: CPT | Performed by: FAMILY MEDICINE

## 2023-08-25 PROCEDURE — 81003 URINALYSIS AUTO W/O SCOPE: CPT | Performed by: FAMILY MEDICINE

## 2023-08-25 NOTE — PROGRESS NOTES
Subjective   Malvin Bustillo is a 73 y.o. male.   Chief Complaint   Patient presents with    Difficulty Urinating    Weight Loss       History of Present Illness   Presents to the office today for reevaluation of his weight.  Malvin has COPD with bronchiectasis.  Frequent cough productive of lots of thick phlegm.  I gave him a burst of prednisone a month ago.  His weight at that time was  He had been losing weight so we decided to try a 1 month trial of Megace.    Weight today is 183 pounds.  That is stable.    Respiratory symptoms have improved.  The steroids helped.  Brings a list of blood pressures.    See below for further discussion.      Patient Active Problem List    Diagnosis Date Noted    Nodule of right lobe of thyroid gland 03/12/2023     Note Last Updated: 3/12/2023     On U/S -moderately suspicious.  Incidentally identified on CT scan of the chest.      Cough 12/15/2022    JERONIMO on CPAP 09/26/2019     Note Last Updated: 9/26/2019     Dr. Nur      Lumbar degenerative disc disease 08/06/2019     Note Last Updated: 8/6/2019     Imaged on abd series 2018      Mixed hyperlipidemia 08/05/2019    Sleep apnea 08/05/2019    Constipation 10/01/2018    Other dorsalgia 06/14/2018    Benign prostatic hyperplasia 04/02/2018    Degenerative joint disease of left knee 01/11/2018    Benign essential hypertension 12/11/2017    Knee pain, left 12/11/2017    Seborrheic keratosis 12/11/2017    Recurrent urinary tract infection 10/10/2017    Adenomatous polyp of colon 06/20/2016    Body mass index (BMI) of 25.0 to 29.9 12/21/2015    Intermittent vertigo 12/05/2014    Fasting hyperglycemia 12/18/2013    Degenerative joint disease 01/01/1960           Past Surgical History:   Procedure Laterality Date    COLONOSCOPY  2013 2013= TA/ 2018= NEG, Rech 2023    HEMORRHOIDECTOMY      INGUINAL HERNIA REPAIR Left     ORCHIECTOMY Left     secondary due to hernia incarceration    TONSILLECTOMY AND ADENOIDECTOMY       Current Outpatient  Medications on File Prior to Visit   Medication Sig    albuterol sulfate HFA (Ventolin HFA) 108 (90 Base) MCG/ACT inhaler Inhale 2 puffs 3 (Three) Times a Day.    Apoaequorin 10 MG capsule Take 1 tablet by mouth Daily.    aspirin (aspirin) 81 MG EC tablet Take 1 tablet by mouth Daily.    cholecalciferol (VITAMIN D3) 1000 units tablet Take 1 tablet by mouth Daily.    diclofenac (VOLTAREN) 50 MG EC tablet TAKE 1 TABLET BY MOUTH TWICE DAILY AS NEEDED FOR JOINT PAIN    docusate sodium (COLACE) 100 MG capsule Take 1 capsule by mouth 2 (Two) Times a Day.    L-Lysine 500 MG tablet tablet Take 1 tablet by mouth Daily.    lisinopril (PRINIVIL,ZESTRIL) 5 MG tablet Take 1 tablet by mouth once daily    megestrol (MEGACE) 40 MG/ML suspension Take 10 mL by mouth Daily.    MULTIPLE VITAMIN PO Take 1 tablet by mouth Daily.    roflumilast (DALIRESP) 500 MCG tablet tablet Take 1 tablet by mouth once daily    rosuvastatin (CRESTOR) 5 MG tablet Take 1 tablet by mouth once daily    tamsulosin (FLOMAX) 0.4 MG capsule 24 hr capsule Take 1 capsule by mouth Daily.    Zinc 50 MG tablet Take 1 tablet by mouth Daily.    [DISCONTINUED] predniSONE (DELTASONE) 20 MG tablet 3 per day x 3 days. 2 per day x 3 days, 1 per day x 2 days, then 1/2 per day x 4 day (Patient not taking: Reported on 2023)     No current facility-administered medications on file prior to visit.     Allergies   Allergen Reactions    Lipitor [Atorvastatin Calcium] Myalgia     Social History     Socioeconomic History    Marital status:    Tobacco Use    Smoking status: Former     Packs/day: 2.00     Years: 10.00     Pack years: 20.00     Types: Cigarettes     Start date:      Quit date:      Years since quittin.6     Passive exposure: Past    Smokeless tobacco: Never   Vaping Use    Vaping Use: Never used   Substance and Sexual Activity    Alcohol use: Yes     Alcohol/week: 8.0 standard drinks     Types: 8 Cans of beer per week    Drug use: No    Sexual  "activity: Yes     Partners: Female     Family History   Problem Relation Age of Onset    Hypertension Mother     Heart disease Mother     Depression Mother     Osteoporosis Mother     Skin cancer Mother     Thyroid disease Mother     Coronary artery disease Mother     Heart disease Father     Diabetes Father     Coronary artery disease Father     Hypertension Brother     Heart disease Brother     No Known Problems Sister        Review of Systems    Objective   /69 (BP Location: Right arm, Patient Position: Sitting, Cuff Size: Adult)   Pulse 79   Temp 97.7 øF (36.5 øC) (Temporal)   Ht 182.9 cm (72\")   Wt 83 kg (183 lb)   SpO2 97%   BMI 24.82 kg/mý   Physical Exam  Constitutional:       Appearance: He is well-developed. He is not toxic-appearing.   HENT:      Head: Normocephalic and atraumatic.   Eyes:      Conjunctiva/sclera: Conjunctivae normal.   Cardiovascular:      Rate and Rhythm: Normal rate and regular rhythm.      Heart sounds: No murmur heard.  Pulmonary:      Effort: Pulmonary effort is normal. No respiratory distress.      Breath sounds: Normal breath sounds.      Comments: Slight barrel chested appearance, does use accessory muscles of respiration to inhale.  Musculoskeletal:         General: Normal range of motion.      Cervical back: Normal range of motion.      Right lower leg: No edema.      Left lower leg: No edema.   Skin:     General: Skin is warm and dry.      Findings: No rash.   Neurological:      General: No focal deficit present.      Mental Status: He is alert and oriented to person, place, and time.      Gait: Gait normal.   Psychiatric:         Mood and Affect: Mood normal.         Behavior: Behavior normal.         Results Encounter on 07/17/2023   Component Date Value Ref Range Status    PSA 07/17/2023 2.3  0.0 - 4.0 ng/mL Final    Comment: Roche ECLIA methodology.  According to the American Urological Association, Serum PSA should  decrease and remain at undetectable levels " after radical  prostatectomy. The AUA defines biochemical recurrence as an initial  PSA value 0.2 ng/mL or greater followed by a subsequent confirmatory  PSA value 0.2 ng/mL or greater.  Values obtained with different assay methods or kits cannot be used  interchangeably. Results cannot be interpreted as absolute evidence  of the presence or absence of malignant disease.      Total Cholesterol 07/17/2023 141  100 - 199 mg/dL Final    Triglycerides 07/17/2023 68  0 - 149 mg/dL Final    HDL Cholesterol 07/17/2023 48  >39 mg/dL Final    VLDL Cholesterol Eleno 07/17/2023 14  5 - 40 mg/dL Final    LDL Chol Calc (NIH) 07/17/2023 79  0 - 99 mg/dL Final    Glucose 07/17/2023 90  70 - 99 mg/dL Final    BUN 07/17/2023 20  8 - 27 mg/dL Final    Creatinine 07/17/2023 1.07  0.76 - 1.27 mg/dL Final    EGFR Result 07/17/2023 74  >59 mL/min/1.73 Final    BUN/Creatinine Ratio 07/17/2023 19  10 - 24 Final    Sodium 07/17/2023 142  134 - 144 mmol/L Final    Potassium 07/17/2023 5.1  3.5 - 5.2 mmol/L Final    Chloride 07/17/2023 107 (H)  96 - 106 mmol/L Final    Total CO2 07/17/2023 23  20 - 29 mmol/L Final    Calcium 07/17/2023 9.8  8.6 - 10.2 mg/dL Final    Total Protein 07/17/2023 6.7  6.0 - 8.5 g/dL Final    Albumin 07/17/2023 4.5  3.8 - 4.8 g/dL Final                  **Please note reference interval change**    Globulin 07/17/2023 2.2  1.5 - 4.5 g/dL Final    A/G Ratio 07/17/2023 2.0  1.2 - 2.2 Final    Total Bilirubin 07/17/2023 0.7  0.0 - 1.2 mg/dL Final    Alkaline Phosphatase 07/17/2023 86  44 - 121 IU/L Final    AST (SGOT) 07/17/2023 18  0 - 40 IU/L Final    ALT (SGPT) 07/17/2023 28  0 - 44 IU/L Final    WBC 07/17/2023 5.0  3.4 - 10.8 x10E3/uL Final    RBC 07/17/2023 4.74  4.14 - 5.80 x10E6/uL Final    Hemoglobin 07/17/2023 14.6  13.0 - 17.7 g/dL Final    Hematocrit 07/17/2023 43.6  37.5 - 51.0 % Final    MCV 07/17/2023 92  79 - 97 fL Final    MCH 07/17/2023 30.8  26.6 - 33.0 pg Final    MCHC 07/17/2023 33.5  31.5 - 35.7 g/dL  Final    RDW 07/17/2023 11.8  11.6 - 15.4 % Final    Platelets 07/17/2023 216  150 - 450 x10E3/uL Final    Neutrophil Rel % 07/17/2023 65  Not Estab. % Final    Lymphocyte Rel % 07/17/2023 24  Not Estab. % Final    Monocyte Rel % 07/17/2023 8  Not Estab. % Final    Eosinophil Rel % 07/17/2023 2  Not Estab. % Final    Basophil Rel % 07/17/2023 1  Not Estab. % Final    Neutrophils Absolute 07/17/2023 3.3  1.4 - 7.0 x10E3/uL Final    Lymphocytes Absolute 07/17/2023 1.2  0.7 - 3.1 x10E3/uL Final    Monocytes Absolute 07/17/2023 0.4  0.1 - 0.9 x10E3/uL Final    Eosinophils Absolute 07/17/2023 0.1  0.0 - 0.4 x10E3/uL Final    Basophils Absolute 07/17/2023 0.0  0.0 - 0.2 x10E3/uL Final    Immature Granulocyte Rel % 07/17/2023 0  Not Estab. % Final    Immature Grans Absolute 07/17/2023 0.0  0.0 - 0.1 x10E3/uL Final    Hep C Virus Ab 07/17/2023 Non Reactive  Non Reactive Final    Comment: HCV antibody alone does not differentiate between previously  resolved infection and active infection. Equivocal and Reactive  HCV antibody results should be followed up with an HCV RNA test  to support the diagnosis of active HCV infection.       Office Visit on 08/25/2023   Component Date Value Ref Range Status    Color 08/25/2023 Yellow  Yellow, Straw, Dark Yellow, Leatha Final    Clarity, UA 08/25/2023 Clear  Clear Final    Specific Gravity  08/25/2023 1.015  1.005 - 1.030 Final    pH, Urine 08/25/2023 7.5  5.0 - 8.0 Final    Leukocytes 08/25/2023 Negative  Negative Final    Nitrite, UA 08/25/2023 Negative  Negative Final    Protein, POC 08/25/2023 Negative  Negative mg/dL Final    Glucose, UA 08/25/2023 Negative  Negative mg/dL Final    Ketones, UA 08/25/2023 Negative  Negative Final    Urobilinogen, UA 08/25/2023 Normal  Normal, 0.2 E.U./dL Final    Bilirubin 08/25/2023 Negative  Negative Final    Blood, UA 08/25/2023 Trace (A)  Negative Final    Lot Number 08/25/2023 [id number]   Final    Expiration Date 08/25/2023 11/30/23   Final            Assessment & Plan   Diagnoses and all orders for this visit:    1. Weight loss, unintentional (Primary)    2. Stage 1 mild COPD by GOLD classification    3. Bronchiectasis without complication    4. Difficulty urinating  -     POCT urinalysis dipstick, automated    5. Nephrolithiasis    6. Benign essential hypertension    7. Seasonal allergic rhinitis due to pollen    Status of multiple things reviewed today.  His weight is stable on our scale at 183 pounds.  He tells me he was 173 pounds at home and is up to 177 pounds.  His appetite is increased a little bit.  He feels good.  Regarding his COPD the steroids I gave him last time did help him cough up the mucus a little bit better.  He reports that he started taking the albuterol inhaler again 2 puffs twice a day and that helps cough stuff up easier.  He had some difficulty urinating a few nights ago.  The next day he saw some small grains of sand in the toilet.  Never saw blood.  He passed a kidney stone before and he had a lot of blood.  After he saw the sand, he has not had any trouble urinating again.  No low back pain.  Recommend we continue to monitor that situation.  He has had allergic rhinitis symptoms.  He wants to know what he can take over-the-counter.  I wrote down Allegra, Flonase, Astepro, Mucinex.  Blood pressure looks good.  He brings some blood pressure numbers today.  They range from 105/66 to 134/77.  Heart rate is in the 80s.    Continue Megace for 2 more months.  Recheck in 2 months to look at his weight, COPD, blood pressure, allergies.          Call with any problems or concerns before next visit       Return in about 2 months (around 10/25/2023).      Much of this report is an electronic transcription of spoken language to printed text using Dragon dictation software.  As such, the subtleties and finesse of spoken language may permit erroneous, or at times, nonsensical words or phrases to be inadvertently transcribed; thus changes  may be made at a later date to rectify these errors.     Lidia Hirsch MD8/25/202310:04 EDT  This note has been electronically signed

## 2023-09-08 DIAGNOSIS — I10 BENIGN ESSENTIAL HYPERTENSION: ICD-10-CM

## 2023-09-08 RX ORDER — LISINOPRIL 5 MG/1
TABLET ORAL
Qty: 90 TABLET | Refills: 1 | Status: SHIPPED | OUTPATIENT
Start: 2023-09-08

## 2023-09-23 DIAGNOSIS — M51.36 LUMBAR DEGENERATIVE DISC DISEASE: ICD-10-CM

## 2023-10-03 ENCOUNTER — TELEPHONE (OUTPATIENT)
Dept: FAMILY MEDICINE CLINIC | Facility: CLINIC | Age: 73
End: 2023-10-03
Payer: MEDICARE

## 2023-10-03 DIAGNOSIS — J44.9 STAGE 1 MILD COPD BY GOLD CLASSIFICATION: Primary | ICD-10-CM

## 2023-10-03 RX ORDER — ALBUTEROL SULFATE 90 UG/1
2 AEROSOL, METERED RESPIRATORY (INHALATION) EVERY 4 HOURS PRN
Qty: 18 G | Refills: 4 | Status: SHIPPED | OUTPATIENT
Start: 2023-10-03

## 2023-10-03 NOTE — TELEPHONE ENCOUNTER
The insurance states it will cover the brand name Ventolin hfa aerosol inhaler can you change it and send it to the pharmacy please

## 2023-10-05 ENCOUNTER — FLU SHOT (OUTPATIENT)
Dept: FAMILY MEDICINE CLINIC | Facility: CLINIC | Age: 73
End: 2023-10-05
Payer: MEDICARE

## 2023-10-05 DIAGNOSIS — Z23 NEED FOR INFLUENZA VACCINATION: Primary | ICD-10-CM

## 2023-10-25 ENCOUNTER — OFFICE VISIT (OUTPATIENT)
Dept: FAMILY MEDICINE CLINIC | Facility: CLINIC | Age: 73
End: 2023-10-25
Payer: MEDICARE

## 2023-10-25 VITALS
OXYGEN SATURATION: 99 % | BODY MASS INDEX: 26.95 KG/M2 | HEIGHT: 72 IN | DIASTOLIC BLOOD PRESSURE: 73 MMHG | HEART RATE: 85 BPM | SYSTOLIC BLOOD PRESSURE: 127 MMHG | TEMPERATURE: 97.5 F | WEIGHT: 199 LBS

## 2023-10-25 DIAGNOSIS — R63.4 WEIGHT LOSS, UNINTENTIONAL: ICD-10-CM

## 2023-10-25 DIAGNOSIS — J47.9 BRONCHIECTASIS WITHOUT COMPLICATION: ICD-10-CM

## 2023-10-25 DIAGNOSIS — I10 BENIGN ESSENTIAL HYPERTENSION: Primary | ICD-10-CM

## 2023-10-25 DIAGNOSIS — J44.9 STAGE 1 MILD COPD BY GOLD CLASSIFICATION: ICD-10-CM

## 2023-10-25 PROCEDURE — 3074F SYST BP LT 130 MM HG: CPT | Performed by: FAMILY MEDICINE

## 2023-10-25 PROCEDURE — 1159F MED LIST DOCD IN RCRD: CPT | Performed by: FAMILY MEDICINE

## 2023-10-25 PROCEDURE — 99213 OFFICE O/P EST LOW 20 MIN: CPT | Performed by: FAMILY MEDICINE

## 2023-10-25 PROCEDURE — 3078F DIAST BP <80 MM HG: CPT | Performed by: FAMILY MEDICINE

## 2023-10-25 PROCEDURE — 1160F RVW MEDS BY RX/DR IN RCRD: CPT | Performed by: FAMILY MEDICINE

## 2023-10-25 NOTE — PROGRESS NOTES
Subjective   Malvin Bustillo is a 73 y.o. male.   Chief Complaint   Patient presents with    Hypertension    COPD       History of Present Illness   Presents to the office today for general follow-up.  I last saw him about 2 months ago.  He had been losing weight so we put him on some Megace.  He was 183 pounds at the last visit.  Weight today is 199 pounds.  He has continued to take the Megace.  Feels his energy is improving he feels a lot better.  Gets up at 5 AM.  He is busy all day.  By 2 or 3 in the afternoon he reports he gets tired.  Oftentimes takes a nap later in the day.  He has been following his blood pressures at home.  They have all been below 131 systolic.    COPD is currently stable.  He is taking the inhaler twice a day now.  He has an occasional cough of some clear phlegm.    Otherwise he offers no particular complaints today.      Patient Active Problem List    Diagnosis Date Noted    Nodule of right lobe of thyroid gland 03/12/2023     Note Last Updated: 3/12/2023     On U/S -moderately suspicious.  Incidentally identified on CT scan of the chest.      Cough 12/15/2022    JERONIMO on CPAP 09/26/2019     Note Last Updated: 9/26/2019     Dr. Nur      Lumbar degenerative disc disease 08/06/2019     Note Last Updated: 8/6/2019     Imaged on abd series 2018      Mixed hyperlipidemia 08/05/2019    Sleep apnea 08/05/2019    Constipation 10/01/2018    Other dorsalgia 06/14/2018    Benign prostatic hyperplasia 04/02/2018    Degenerative joint disease of left knee 01/11/2018    Benign essential hypertension 12/11/2017    Knee pain, left 12/11/2017    Seborrheic keratosis 12/11/2017    Recurrent urinary tract infection 10/10/2017    Adenomatous polyp of colon 06/20/2016    Body mass index (BMI) of 25.0 to 29.9 12/21/2015    Intermittent vertigo 12/05/2014    Fasting hyperglycemia 12/18/2013    Degenerative joint disease 01/01/1960           Past Surgical History:   Procedure Laterality Date    COLONOSCOPY  2013     2013= TA/ 2018= NEG, Rech 2023    HEMORRHOIDECTOMY      INGUINAL HERNIA REPAIR Left     ORCHIECTOMY Left     secondary due to hernia incarceration    TONSILLECTOMY AND ADENOIDECTOMY       Current Outpatient Medications on File Prior to Visit   Medication Sig    aspirin (aspirin) 81 MG EC tablet Take 1 tablet by mouth Daily.    diclofenac (VOLTAREN) 50 MG EC tablet TAKE 1 TABLET BY MOUTH TWICE DAILY AS NEEDED FOR  JOINT  PAIN    L-Lysine 500 MG tablet tablet Take 1 tablet by mouth Daily.    lisinopril (PRINIVIL,ZESTRIL) 5 MG tablet Take 1 tablet by mouth once daily    megestrol (MEGACE) 40 MG/ML suspension Take 10 mL by mouth Daily.    MULTIPLE VITAMIN PO Take 1 tablet by mouth Daily.    roflumilast (DALIRESP) 500 MCG tablet tablet Take 1 tablet by mouth once daily    rosuvastatin (CRESTOR) 5 MG tablet Take 1 tablet by mouth once daily    tamsulosin (FLOMAX) 0.4 MG capsule 24 hr capsule Take 1 capsule by mouth Daily.    Zinc 50 MG tablet Take 1 tablet by mouth Daily.    albuterol sulfate HFA (Ventolin HFA) 108 (90 Base) MCG/ACT inhaler Inhale 2 puffs Every 4 (Four) Hours As Needed for Wheezing. (Patient not taking: Reported on 10/25/2023)    cholecalciferol (VITAMIN D3) 1000 units tablet Take 1 tablet by mouth Daily. (Patient not taking: Reported on 10/25/2023)    docusate sodium (COLACE) 100 MG capsule Take 1 capsule by mouth 2 (Two) Times a Day. (Patient not taking: Reported on 10/25/2023)    [DISCONTINUED] Apoaequorin 10 MG capsule Take 1 tablet by mouth Daily. (Patient not taking: Reported on 10/25/2023)     No current facility-administered medications on file prior to visit.     Allergies   Allergen Reactions    Lipitor [Atorvastatin Calcium] Myalgia     Social History     Socioeconomic History    Marital status:    Tobacco Use    Smoking status: Former     Packs/day: 2.00     Years: 10.00     Additional pack years: 0.00     Total pack years: 20.00     Types: Cigarettes     Start date: 1986     Quit date:  "     Years since quittin.8     Passive exposure: Past    Smokeless tobacco: Never   Vaping Use    Vaping Use: Never used   Substance and Sexual Activity    Alcohol use: Yes     Alcohol/week: 8.0 standard drinks of alcohol     Types: 8 Cans of beer per week    Drug use: No    Sexual activity: Yes     Partners: Female     Family History   Problem Relation Age of Onset    Hypertension Mother     Heart disease Mother     Depression Mother     Osteoporosis Mother     Skin cancer Mother     Thyroid disease Mother     Coronary artery disease Mother     Heart disease Father     Diabetes Father     Coronary artery disease Father     Hypertension Brother     Heart disease Brother     No Known Problems Sister        Review of Systems    Objective   /73 (BP Location: Right arm, Patient Position: Sitting, Cuff Size: Adult)   Pulse 85   Temp 97.5 °F (36.4 °C) (Infrared)   Ht 182.9 cm (72.01\")   Wt 90.3 kg (199 lb)   SpO2 99%   BMI 26.98 kg/m²   Physical Exam  Constitutional:       Appearance: He is well-developed.      Comments: Robust elderly white male-looks a little older than known age of 73 years.  Has gained a little weight.     HENT:      Head: Normocephalic and atraumatic.   Eyes:      Conjunctiva/sclera: Conjunctivae normal.   Cardiovascular:      Rate and Rhythm: Normal rate and regular rhythm.      Heart sounds: No murmur heard.  Pulmonary:      Effort: Accessory muscle usage present. No respiratory distress.      Breath sounds: Decreased breath sounds present.   Musculoskeletal:         General: Normal range of motion.      Cervical back: Normal range of motion.      Right lower leg: No edema.      Left lower leg: No edema.   Skin:     General: Skin is warm and dry.      Coloration: Skin is not pale.      Findings: No rash.   Neurological:      General: No focal deficit present.      Mental Status: He is alert and oriented to person, place, and time.      Gait: Gait normal.   Psychiatric:         " Mood and Affect: Mood normal.         Behavior: Behavior normal.           Office Visit on 08/25/2023   Component Date Value Ref Range Status    Color 08/25/2023 Yellow  Yellow, Straw, Dark Yellow, Leatha Final    Clarity, UA 08/25/2023 Clear  Clear Final    Specific Gravity  08/25/2023 1.015  1.005 - 1.030 Final    pH, Urine 08/25/2023 7.5  5.0 - 8.0 Final    Leukocytes 08/25/2023 Negative  Negative Final    Nitrite, UA 08/25/2023 Negative  Negative Final    Protein, POC 08/25/2023 Negative  Negative mg/dL Final    Glucose, UA 08/25/2023 Negative  Negative mg/dL Final    Ketones, UA 08/25/2023 Negative  Negative Final    Urobilinogen, UA 08/25/2023 Normal  Normal, 0.2 E.U./dL Final    Bilirubin 08/25/2023 Negative  Negative Final    Blood, UA 08/25/2023 Trace (A)  Negative Final    Lot Number 08/25/2023 [id number]   Final    Expiration Date 08/25/2023 11/30/23   Final   Results Encounter on 07/17/2023   Component Date Value Ref Range Status    PSA 07/17/2023 2.3  0.0 - 4.0 ng/mL Final    Comment: Roche ECLIA methodology.  According to the American Urological Association, Serum PSA should  decrease and remain at undetectable levels after radical  prostatectomy. The AUA defines biochemical recurrence as an initial  PSA value 0.2 ng/mL or greater followed by a subsequent confirmatory  PSA value 0.2 ng/mL or greater.  Values obtained with different assay methods or kits cannot be used  interchangeably. Results cannot be interpreted as absolute evidence  of the presence or absence of malignant disease.      Total Cholesterol 07/17/2023 141  100 - 199 mg/dL Final    Triglycerides 07/17/2023 68  0 - 149 mg/dL Final    HDL Cholesterol 07/17/2023 48  >39 mg/dL Final    VLDL Cholesterol Eleno 07/17/2023 14  5 - 40 mg/dL Final    LDL Chol Calc (NIH) 07/17/2023 79  0 - 99 mg/dL Final    Glucose 07/17/2023 90  70 - 99 mg/dL Final    BUN 07/17/2023 20  8 - 27 mg/dL Final    Creatinine 07/17/2023 1.07  0.76 - 1.27 mg/dL Final    EGFR  Result 07/17/2023 74  >59 mL/min/1.73 Final    BUN/Creatinine Ratio 07/17/2023 19  10 - 24 Final    Sodium 07/17/2023 142  134 - 144 mmol/L Final    Potassium 07/17/2023 5.1  3.5 - 5.2 mmol/L Final    Chloride 07/17/2023 107 (H)  96 - 106 mmol/L Final    Total CO2 07/17/2023 23  20 - 29 mmol/L Final    Calcium 07/17/2023 9.8  8.6 - 10.2 mg/dL Final    Total Protein 07/17/2023 6.7  6.0 - 8.5 g/dL Final    Albumin 07/17/2023 4.5  3.8 - 4.8 g/dL Final                  **Please note reference interval change**    Globulin 07/17/2023 2.2  1.5 - 4.5 g/dL Final    A/G Ratio 07/17/2023 2.0  1.2 - 2.2 Final    Total Bilirubin 07/17/2023 0.7  0.0 - 1.2 mg/dL Final    Alkaline Phosphatase 07/17/2023 86  44 - 121 IU/L Final    AST (SGOT) 07/17/2023 18  0 - 40 IU/L Final    ALT (SGPT) 07/17/2023 28  0 - 44 IU/L Final    WBC 07/17/2023 5.0  3.4 - 10.8 x10E3/uL Final    RBC 07/17/2023 4.74  4.14 - 5.80 x10E6/uL Final    Hemoglobin 07/17/2023 14.6  13.0 - 17.7 g/dL Final    Hematocrit 07/17/2023 43.6  37.5 - 51.0 % Final    MCV 07/17/2023 92  79 - 97 fL Final    MCH 07/17/2023 30.8  26.6 - 33.0 pg Final    MCHC 07/17/2023 33.5  31.5 - 35.7 g/dL Final    RDW 07/17/2023 11.8  11.6 - 15.4 % Final    Platelets 07/17/2023 216  150 - 450 x10E3/uL Final    Neutrophil Rel % 07/17/2023 65  Not Estab. % Final    Lymphocyte Rel % 07/17/2023 24  Not Estab. % Final    Monocyte Rel % 07/17/2023 8  Not Estab. % Final    Eosinophil Rel % 07/17/2023 2  Not Estab. % Final    Basophil Rel % 07/17/2023 1  Not Estab. % Final    Neutrophils Absolute 07/17/2023 3.3  1.4 - 7.0 x10E3/uL Final    Lymphocytes Absolute 07/17/2023 1.2  0.7 - 3.1 x10E3/uL Final    Monocytes Absolute 07/17/2023 0.4  0.1 - 0.9 x10E3/uL Final    Eosinophils Absolute 07/17/2023 0.1  0.0 - 0.4 x10E3/uL Final    Basophils Absolute 07/17/2023 0.0  0.0 - 0.2 x10E3/uL Final    Immature Granulocyte Rel % 07/17/2023 0  Not Estab. % Final    Immature Grans Absolute 07/17/2023 0.0  0.0 -  0.1 x10E3/uL Final    Hep C Virus Ab 07/17/2023 Non Reactive  Non Reactive Final    Comment: HCV antibody alone does not differentiate between previously  resolved infection and active infection. Equivocal and Reactive  HCV antibody results should be followed up with an HCV RNA test  to support the diagnosis of active HCV infection.               Assessment & Plan   Diagnoses and all orders for this visit:    1. Benign essential hypertension (Primary)    2. Stage 1 mild COPD by GOLD classification    3. Weight loss, unintentional    4. Bronchiectasis without complication    Overall I spent 20 minutes on the day of service discussing everything above and below with Malvin.  Blood pressure is excellent.  Continue lisinopril at current dose.  COPD is currently stable.  He is not having a cough productive of large amounts of thick phlegm.  Still has some mild cough in the mornings.  This is helped with his inhaler.  Weight loss has stabilized.  We are going to have him discontinue the Megace as his weight has returned to baseline and even tells me that his clothes are starting to fit tight again.  There are several other medicines and supplements he has stopped taking.  Medication list adjusted today.  Continue all other current medicines at current doses.  Follow-up here again in 3 months to make sure he is still doing okay.        Call with any problems or concerns before next visit       Return in about 3 months (around 1/25/2024).      Much of this report is an electronic transcription of spoken language to printed text using Dragon dictation software.  As such, the subtleties and finesse of spoken language may permit erroneous, or at times, nonsensical words or phrases to be inadvertently transcribed; thus changes may be made at a later date to rectify these errors.     Lidia Hirsch [id number] EDT  This note has been electronically signed

## 2023-12-18 DIAGNOSIS — M51.36 LUMBAR DEGENERATIVE DISC DISEASE: ICD-10-CM

## 2024-01-26 ENCOUNTER — OFFICE VISIT (OUTPATIENT)
Dept: FAMILY MEDICINE CLINIC | Facility: CLINIC | Age: 74
End: 2024-01-26
Payer: MEDICARE

## 2024-01-26 VITALS
OXYGEN SATURATION: 98 % | SYSTOLIC BLOOD PRESSURE: 124 MMHG | TEMPERATURE: 97.5 F | HEIGHT: 72 IN | WEIGHT: 201 LBS | BODY MASS INDEX: 27.22 KG/M2 | DIASTOLIC BLOOD PRESSURE: 68 MMHG | HEART RATE: 83 BPM

## 2024-01-26 DIAGNOSIS — G47.33 OSA ON CPAP: ICD-10-CM

## 2024-01-26 DIAGNOSIS — E04.1 THYROID NODULE GREATER THAN OR EQUAL TO 1 CM IN DIAMETER INCIDENTALLY NOTED ON IMAGING STUDY: ICD-10-CM

## 2024-01-26 DIAGNOSIS — I10 BENIGN ESSENTIAL HYPERTENSION: Primary | ICD-10-CM

## 2024-01-26 DIAGNOSIS — J44.9 STAGE 1 MILD COPD BY GOLD CLASSIFICATION: ICD-10-CM

## 2024-01-26 DIAGNOSIS — R91.8 MULTIPLE PULMONARY NODULES: ICD-10-CM

## 2024-01-26 PROCEDURE — 1160F RVW MEDS BY RX/DR IN RCRD: CPT | Performed by: FAMILY MEDICINE

## 2024-01-26 PROCEDURE — 3078F DIAST BP <80 MM HG: CPT | Performed by: FAMILY MEDICINE

## 2024-01-26 PROCEDURE — 99214 OFFICE O/P EST MOD 30 MIN: CPT | Performed by: FAMILY MEDICINE

## 2024-01-26 PROCEDURE — 1159F MED LIST DOCD IN RCRD: CPT | Performed by: FAMILY MEDICINE

## 2024-01-26 PROCEDURE — 3074F SYST BP LT 130 MM HG: CPT | Performed by: FAMILY MEDICINE

## 2024-01-26 NOTE — PROGRESS NOTES
Subjective   Malvin Bustillo is a 73 y.o. male.   Chief Complaint   Patient presents with    Hypertension       History of Present Illness   Presents to the office today to follow-up on hypertension, COPD, weight loss.    HTN-blood pressure is excellent at 124/68.  Tolerating lisinopril without side effect.  He brings a list of blood pressure numbers from home.  Overall systolic averages around 120.  Lowest was 109 and highest was 139.    COPD-also has sleep apnea, following with Dr. Nur now.  Symptom includes a lot of phlegm and productive cough.  CT chest almost 1 year ago showed a 2 mm and a 3 mm pulmonary nodule.  1 year follow-up was recommended.    Thyroid nodule-this was biopsied in March of last year and was benign.  They recommended a follow-up ultrasound.  Asymptomatic regarding that.    Weight loss-he had some unexplained weight loss last year.  Treated him with Megace for a little while.  His weight stabilized.  As of October last year his weight was 199 pounds.  Weight today is 201 pounds.  He tells me his weight is pretty stable at home between 192 and 196 pounds.      Last lab work was 6 months ago in July.      Patient Active Problem List    Diagnosis Date Noted    Stage 1 mild COPD by GOLD classification 01/26/2024    Nodule of right lobe of thyroid gland 03/12/2023     Note Last Updated: 3/12/2023     On U/S -moderately suspicious.  Incidentally identified on CT scan of the chest.      Cough 12/15/2022    JERONIMO on CPAP 09/26/2019     Note Last Updated: 9/26/2019     Dr. Nur      Lumbar degenerative disc disease 08/06/2019     Note Last Updated: 8/6/2019     Imaged on abd series 2018      Mixed hyperlipidemia 08/05/2019    Sleep apnea 08/05/2019    Constipation 10/01/2018    Other dorsalgia 06/14/2018    Benign prostatic hyperplasia 04/02/2018    Degenerative joint disease of left knee 01/11/2018    Benign essential hypertension 12/11/2017    Knee pain, left 12/11/2017    Seborrheic keratosis 12/11/2017     Recurrent urinary tract infection 10/10/2017    Adenomatous polyp of colon 2016    Body mass index (BMI) of 25.0 to 29.9 2015    Intermittent vertigo 2014    Fasting hyperglycemia 2013    Degenerative joint disease 1960           Past Surgical History:   Procedure Laterality Date    COLONOSCOPY  2013= TA/ 2018= NEG, Rech     HEMORRHOIDECTOMY      INGUINAL HERNIA REPAIR Left     ORCHIECTOMY Left     secondary due to hernia incarceration    TONSILLECTOMY AND ADENOIDECTOMY       Current Outpatient Medications on File Prior to Visit   Medication Sig    albuterol sulfate HFA (Ventolin HFA) 108 (90 Base) MCG/ACT inhaler Inhale 2 puffs Every 4 (Four) Hours As Needed for Wheezing.    aspirin (aspirin) 81 MG EC tablet Take 1 tablet by mouth Daily.    diclofenac (VOLTAREN) 50 MG EC tablet TAKE 1 TABLET BY MOUTH TWICE DAILY AS NEEDED FOR  JOINT  PAIN    L-Lysine 500 MG tablet tablet Take 1 tablet by mouth Daily.    lisinopril (PRINIVIL,ZESTRIL) 5 MG tablet Take 1 tablet by mouth once daily    MULTIPLE VITAMIN PO Take 1 tablet by mouth Daily.    roflumilast (DALIRESP) 500 MCG tablet tablet Take 1 tablet by mouth once daily    rosuvastatin (CRESTOR) 5 MG tablet Take 1 tablet by mouth once daily    tamsulosin (FLOMAX) 0.4 MG capsule 24 hr capsule Take 1 capsule by mouth Daily.    Zinc 50 MG tablet Take 1 tablet by mouth Daily.    [DISCONTINUED] megestrol (MEGACE) 40 MG/ML suspension Take 10 mL by mouth Daily.     No current facility-administered medications on file prior to visit.     Allergies   Allergen Reactions    Lipitor [Atorvastatin Calcium] Myalgia     Social History     Socioeconomic History    Marital status:    Tobacco Use    Smoking status: Former     Packs/day: 2.00     Years: 10.00     Additional pack years: 0.00     Total pack years: 20.00     Types: Cigarettes     Start date:      Quit date:      Years since quittin.0     Passive exposure: Past     "Smokeless tobacco: Never   Vaping Use    Vaping Use: Never used   Substance and Sexual Activity    Alcohol use: Yes     Alcohol/week: 8.0 standard drinks of alcohol     Types: 8 Cans of beer per week    Drug use: No    Sexual activity: Yes     Partners: Female     Family History   Problem Relation Age of Onset    Hypertension Mother     Heart disease Mother     Depression Mother     Osteoporosis Mother     Skin cancer Mother     Thyroid disease Mother     Coronary artery disease Mother     Heart disease Father     Diabetes Father     Coronary artery disease Father     Hypertension Brother     Heart disease Brother     No Known Problems Sister        Review of Systems    Objective   /68 (BP Location: Right arm, Patient Position: Sitting, Cuff Size: Adult)   Pulse 83   Temp 97.5 °F (36.4 °C) (Infrared)   Ht 182.9 cm (72.01\")   Wt 91.2 kg (201 lb)   SpO2 98%   BMI 27.25 kg/m²   Physical Exam  Constitutional:       General: He is not in acute distress.     Appearance: He is well-developed.      Comments: Robust elderly white male-looks a little older than known age of 73 years.  Has gained a little weight.     HENT:      Head: Normocephalic and atraumatic.   Eyes:      Conjunctiva/sclera: Conjunctivae normal.   Cardiovascular:      Rate and Rhythm: Normal rate and regular rhythm.      Heart sounds: No murmur heard.  Pulmonary:      Effort: Accessory muscle usage present. No respiratory distress.      Breath sounds: Normal breath sounds. Decreased breath sounds present. No wheezing, rhonchi or rales.   Musculoskeletal:         General: Normal range of motion.      Cervical back: Normal range of motion.      Right lower leg: No edema.      Left lower leg: No edema.   Skin:     General: Skin is warm and dry.      Coloration: Skin is not pale.      Findings: No rash.   Neurological:      General: No focal deficit present.      Mental Status: He is alert and oriented to person, place, and time.      Gait: Gait " normal.   Psychiatric:         Mood and Affect: Mood normal.         Behavior: Behavior normal.            Assessment & Plan   Diagnoses and all orders for this visit:    1. Benign essential hypertension (Primary)    2. JERONIMO on CPAP    3. Stage 1 mild COPD by GOLD classification    4. Thyroid nodule greater than or equal to 1 cm in diameter incidentally noted on imaging study  -     US Thyroid; Future    5. Multiple pulmonary nodules  -     CT Chest Without Contrast; Future    Status of multiple chronic medical problems reviewed today as above.  Hypertension is a chronic problem currently at goal.  Continue lisinopril 5 mg/day.  Keep follow-up with Dr. Nur for management of sleep apnea.  COPD is asymptomatic.  It has been 1 year since his last CT chest.  Will repeat that to follow-up on those small pulmonary nodules.  Will get a thyroid ultrasound to follow-up on his thyroid nodule.  I will follow-up with him when the results of these test are back.  Will see him again in 6 months.  At that time it will be time to do a PSA, lipid panel, CMP, CBC.    He is waiting to see the gastroenterologist to be cleared for his colonoscopy since he has been diagnosed with COPD.        Call with any problems or concerns before next visit       Return in about 6 months (around 7/26/2024).      Much of this report is an electronic transcription of spoken language to printed text using Dragon dictation software.  As such, the subtleties and finesse of spoken language may permit erroneous, or at times, nonsensical words or phrases to be inadvertently transcribed; thus changes may be made at a later date to rectify these errors.     Lidia Hirsch MD1/26/202410:12 EST  This note has been electronically signed

## 2024-01-31 ENCOUNTER — OFFICE (AMBULATORY)
Dept: URBAN - METROPOLITAN AREA CLINIC 64 | Facility: CLINIC | Age: 74
End: 2024-01-31

## 2024-01-31 VITALS
DIASTOLIC BLOOD PRESSURE: 59 MMHG | WEIGHT: 203 LBS | SYSTOLIC BLOOD PRESSURE: 106 MMHG | HEIGHT: 72 IN | SYSTOLIC BLOOD PRESSURE: 72 MMHG

## 2024-01-31 DIAGNOSIS — Z09 ENCOUNTER FOR FOLLOW-UP EXAMINATION AFTER COMPLETED TREATMEN: ICD-10-CM

## 2024-01-31 DIAGNOSIS — Z86.010 PERSONAL HISTORY OF COLONIC POLYPS: ICD-10-CM

## 2024-01-31 PROCEDURE — NOCRG: HCPCS | Performed by: NURSE PRACTITIONER

## 2024-02-12 ENCOUNTER — HOSPITAL ENCOUNTER (OUTPATIENT)
Dept: ULTRASOUND IMAGING | Facility: HOSPITAL | Age: 74
Discharge: HOME | End: 2024-02-12
Payer: MEDICARE

## 2024-02-12 ENCOUNTER — HOSPITAL ENCOUNTER (OUTPATIENT)
Dept: CT IMAGING | Facility: HOSPITAL | Age: 74
Discharge: HOME | End: 2024-02-12
Payer: MEDICARE

## 2024-02-12 DIAGNOSIS — E04.1 THYROID NODULE GREATER THAN OR EQUAL TO 1 CM IN DIAMETER INCIDENTALLY NOTED ON IMAGING STUDY: ICD-10-CM

## 2024-02-12 DIAGNOSIS — R91.8 MULTIPLE PULMONARY NODULES: ICD-10-CM

## 2024-02-12 PROCEDURE — 76536 US EXAM OF HEAD AND NECK: CPT

## 2024-02-12 PROCEDURE — 71250 CT THORAX DX C-: CPT

## 2024-02-28 DIAGNOSIS — I10 BENIGN ESSENTIAL HYPERTENSION: ICD-10-CM

## 2024-02-28 RX ORDER — LISINOPRIL 5 MG/1
TABLET ORAL
Qty: 90 TABLET | Refills: 3 | Status: SHIPPED | OUTPATIENT
Start: 2024-02-28

## 2024-03-04 DIAGNOSIS — J44.9 STAGE 1 MILD COPD BY GOLD CLASSIFICATION: ICD-10-CM

## 2024-03-04 RX ORDER — ALBUTEROL SULFATE 90 UG/1
AEROSOL, METERED RESPIRATORY (INHALATION)
Qty: 18 G | Refills: 2 | Status: SHIPPED | OUTPATIENT
Start: 2024-03-04

## 2024-03-08 ENCOUNTER — ON CAMPUS - OUTPATIENT (AMBULATORY)
Dept: URBAN - METROPOLITAN AREA HOSPITAL 2 | Facility: HOSPITAL | Age: 74
End: 2024-03-08

## 2024-03-08 ENCOUNTER — OFFICE (AMBULATORY)
Dept: URBAN - METROPOLITAN AREA PATHOLOGY 19 | Facility: PATHOLOGY | Age: 74
End: 2024-03-08
Payer: COMMERCIAL

## 2024-03-08 ENCOUNTER — OFFICE (AMBULATORY)
Dept: URBAN - METROPOLITAN AREA PATHOLOGY 19 | Facility: PATHOLOGY | Age: 74
End: 2024-03-08

## 2024-03-08 VITALS
SYSTOLIC BLOOD PRESSURE: 105 MMHG | DIASTOLIC BLOOD PRESSURE: 62 MMHG | DIASTOLIC BLOOD PRESSURE: 71 MMHG | HEART RATE: 77 BPM | HEART RATE: 80 BPM | SYSTOLIC BLOOD PRESSURE: 149 MMHG | RESPIRATION RATE: 16 BRPM | OXYGEN SATURATION: 98 % | WEIGHT: 191 LBS | OXYGEN SATURATION: 94 % | HEART RATE: 83 BPM | SYSTOLIC BLOOD PRESSURE: 102 MMHG | TEMPERATURE: 98 F | SYSTOLIC BLOOD PRESSURE: 98 MMHG | DIASTOLIC BLOOD PRESSURE: 70 MMHG | DIASTOLIC BLOOD PRESSURE: 65 MMHG | HEART RATE: 81 BPM | HEART RATE: 76 BPM | DIASTOLIC BLOOD PRESSURE: 72 MMHG | HEART RATE: 73 BPM | HEART RATE: 79 BPM | SYSTOLIC BLOOD PRESSURE: 130 MMHG | DIASTOLIC BLOOD PRESSURE: 69 MMHG | SYSTOLIC BLOOD PRESSURE: 93 MMHG | OXYGEN SATURATION: 100 % | SYSTOLIC BLOOD PRESSURE: 91 MMHG | HEIGHT: 72 IN | OXYGEN SATURATION: 99 % | RESPIRATION RATE: 17 BRPM | DIASTOLIC BLOOD PRESSURE: 84 MMHG

## 2024-03-08 DIAGNOSIS — K57.30 DIVERTICULOSIS OF LARGE INTESTINE WITHOUT PERFORATION OR ABS: ICD-10-CM

## 2024-03-08 DIAGNOSIS — D12.3 BENIGN NEOPLASM OF TRANSVERSE COLON: ICD-10-CM

## 2024-03-08 DIAGNOSIS — Z86.010 PERSONAL HISTORY OF COLONIC POLYPS: ICD-10-CM

## 2024-03-08 DIAGNOSIS — Z09 ENCOUNTER FOR FOLLOW-UP EXAMINATION AFTER COMPLETED TREATMEN: ICD-10-CM

## 2024-03-08 DIAGNOSIS — K55.20 ANGIODYSPLASIA OF COLON WITHOUT HEMORRHAGE: ICD-10-CM

## 2024-03-08 PROBLEM — K63.5 POLYP OF COLON: Status: ACTIVE | Noted: 2024-03-08

## 2024-03-08 LAB
GI HISTOLOGY: A. TRANSVERSE COLON: (no result)
GI HISTOLOGY: PDF REPORT: (no result)

## 2024-03-08 PROCEDURE — 45385 COLONOSCOPY W/LESION REMOVAL: CPT | Mod: PT | Performed by: INTERNAL MEDICINE

## 2024-03-08 PROCEDURE — 88305 TISSUE EXAM BY PATHOLOGIST: CPT | Mod: 26 | Performed by: PATHOLOGY

## 2024-04-26 RX ORDER — ROFLUMILAST 500 UG/1
TABLET ORAL
Qty: 90 TABLET | Refills: 3 | Status: SHIPPED | OUTPATIENT
Start: 2024-04-26

## 2024-05-14 DIAGNOSIS — E78.2 MIXED HYPERLIPIDEMIA: ICD-10-CM

## 2024-05-14 RX ORDER — ROSUVASTATIN CALCIUM 5 MG/1
TABLET, COATED ORAL
Qty: 90 TABLET | Refills: 3 | Status: SHIPPED | OUTPATIENT
Start: 2024-05-14

## 2024-07-02 DIAGNOSIS — M51.36 LUMBAR DEGENERATIVE DISC DISEASE: ICD-10-CM

## 2024-07-23 ENCOUNTER — TELEPHONE (OUTPATIENT)
Dept: FAMILY MEDICINE CLINIC | Facility: CLINIC | Age: 74
End: 2024-07-23
Payer: MEDICARE

## 2024-07-23 DIAGNOSIS — I10 BENIGN ESSENTIAL HYPERTENSION: Primary | ICD-10-CM

## 2024-07-23 DIAGNOSIS — R73.01 FASTING HYPERGLYCEMIA: ICD-10-CM

## 2024-07-23 DIAGNOSIS — Z12.5 ENCOUNTER FOR PROSTATE CANCER SCREENING: ICD-10-CM

## 2024-07-23 DIAGNOSIS — E78.2 MIXED HYPERLIPIDEMIA: ICD-10-CM

## 2024-07-23 NOTE — TELEPHONE ENCOUNTER
Pt coming in for labs tomorrow.(7/24/24).  There are no orders for lab work in the chart.  Please advise.

## 2024-07-25 PROBLEM — R91.8 MULTIPLE PULMONARY NODULES: Status: ACTIVE | Noted: 2024-07-25

## 2024-07-25 PROBLEM — G47.30 SLEEP APNEA: Status: RESOLVED | Noted: 2019-08-05 | Resolved: 2024-07-25

## 2024-07-25 PROBLEM — R73.03 PREDIABETES: Status: ACTIVE | Noted: 2024-07-25

## 2024-07-25 NOTE — PROGRESS NOTES
Subjective   Malvin Bustillo is a 74 y.o. male.   Chief Complaint   Patient presents with    Hypertension       History of Present Illness   74 y.o. male with PMH as below presents the office today to follow-up on multiple chronic medical problems.    Thyroid nodules-will repeat thyroid ultrasound in February 2026    Multiple small pulmonary nodules-will be due for repeat CT of the chest in February 2025.    JERONIMO-followed by Dr. Nur    COPD- stable.  Needs refill on albuterol.    HTN - Brings list of B/P numbers - SBP average less than 130.      HLD-lipid panel last week was excellent.  Total cholesterol 143, LDL 83, HDL 42, triglycerides 97.    BPH-he has seeing urology.  PSA back in February of this year was 1.1.  We repeated it a few days ago with his labs and it was 1.9.   1 year ago, July 17, 2023-PSA was 2.3.    History of elevated blood sugar-A1c a few days ago was 5.7% consistent with prediabetes.  CMP showed a normal glucose of 90.  Normal GFR 66.  Normal LFTs and normal electrolytes.    CBC with labs last week was also normal.  Hemoglobin stable at 14.4.    Lots of chronic head congestion -   Previous allergy meds didn't help.  Taking OTC Claritin        Patient Active Problem List    Diagnosis Date Noted    Multiple pulmonary nodules 07/25/2024    Prediabetes 07/25/2024    Stage 1 mild COPD by GOLD classification 01/26/2024    Nodule of right lobe of thyroid gland 03/12/2023     Note Last Updated: 3/12/2023     On U/S -moderately suspicious.  Incidentally identified on CT scan of the chest.      Cough 12/15/2022    JERONIMO on CPAP 09/26/2019     Note Last Updated: 9/26/2019     Dr. Nur      Lumbar degenerative disc disease 08/06/2019     Note Last Updated: 8/6/2019     Imaged on abd series 2018      Mixed hyperlipidemia 08/05/2019    Constipation 10/01/2018    Other dorsalgia 06/14/2018    Benign prostatic hyperplasia 04/02/2018    Degenerative joint disease of left knee 01/11/2018    Benign essential  hypertension 2017    Knee pain, left 2017    Seborrheic keratosis 2017    Recurrent urinary tract infection 10/10/2017    Adenomatous polyp of colon 2016    Body mass index (BMI) of 25.0 to 29.9 2015    Intermittent vertigo 2014    Degenerative joint disease 1960           Past Surgical History:   Procedure Laterality Date    COLONOSCOPY  2013= TA/ 2018= NEG, Rech     HEMORRHOIDECTOMY      INGUINAL HERNIA REPAIR Left     ORCHIECTOMY Left     secondary due to hernia incarceration    TONSILLECTOMY AND ADENOIDECTOMY       Current Outpatient Medications on File Prior to Visit   Medication Sig    aspirin (aspirin) 81 MG EC tablet Take 1 tablet by mouth Daily.    diclofenac (VOLTAREN) 50 MG EC tablet TAKE 1 TABLET BY MOUTH TWICE DAILY AS NEEDED FOR  JOINT  PAIN    L-Lysine 500 MG tablet tablet Take 1 tablet by mouth Daily.    lisinopril (PRINIVIL,ZESTRIL) 5 MG tablet Take 1 tablet by mouth once daily    MULTIPLE VITAMIN PO Take 1 tablet by mouth Daily.    roflumilast (DALIRESP) 500 MCG tablet tablet Take 1 tablet by mouth once daily    rosuvastatin (CRESTOR) 5 MG tablet Take 1 tablet by mouth once daily    tamsulosin (FLOMAX) 0.4 MG capsule 24 hr capsule Take 1 capsule by mouth Daily.    Zinc 50 MG tablet Take 1 tablet by mouth Daily.    [DISCONTINUED] Ventolin  (90 Base) MCG/ACT inhaler INHALE 2 PUFFS BY MOUTH THREE TIMES DAILY     No current facility-administered medications on file prior to visit.     Allergies   Allergen Reactions    Lipitor [Atorvastatin Calcium] Myalgia     Social History     Socioeconomic History    Marital status:    Tobacco Use    Smoking status: Former     Current packs/day: 0.00     Average packs/day: 2.0 packs/day for 10.0 years (20.0 ttl pk-yrs)     Types: Cigarettes     Start date:      Quit date:      Years since quittin.5     Passive exposure: Past    Smokeless tobacco: Never   Vaping Use    Vaping status:  "Never Used   Substance and Sexual Activity    Alcohol use: Yes     Alcohol/week: 8.0 standard drinks of alcohol     Types: 8 Cans of beer per week    Drug use: No    Sexual activity: Yes     Partners: Female     Family History   Problem Relation Age of Onset    Hypertension Mother     Heart disease Mother     Depression Mother     Osteoporosis Mother     Skin cancer Mother     Thyroid disease Mother     Coronary artery disease Mother     Heart disease Father     Diabetes Father     Coronary artery disease Father     Hypertension Brother     Heart disease Brother     No Known Problems Sister        Review of Systems    Objective   /65 (BP Location: Left arm, Patient Position: Sitting, Cuff Size: Adult)   Pulse 72   Temp 97.5 °F (36.4 °C)   Ht 182.9 cm (72.01\")   Wt 83.7 kg (184 lb 9.6 oz)   SpO2 97%   BMI 25.03 kg/m²   Physical Exam  Constitutional:       Appearance: He is well-developed. He is not toxic-appearing.      Comments:      HENT:      Head: Normocephalic and atraumatic.      Nose: Congestion present.   Eyes:      Conjunctiva/sclera: Conjunctivae normal.   Cardiovascular:      Rate and Rhythm: Normal rate.   Pulmonary:      Effort: Pulmonary effort is normal. No respiratory distress.      Breath sounds: No wheezing, rhonchi or rales.   Musculoskeletal:         General: Normal range of motion.      Cervical back: Normal range of motion.      Right lower leg: No edema.      Left lower leg: No edema.   Skin:     General: Skin is warm and dry.      Findings: No rash.   Neurological:      Mental Status: He is alert and oriented to person, place, and time.   Psychiatric:         Behavior: Behavior normal.           Results Encounter on 07/24/2024   Component Date Value Ref Range Status    WBC 07/24/2024 5.0  3.4 - 10.8 x10E3/uL Final    RBC 07/24/2024 4.73  4.14 - 5.80 x10E6/uL Final    Hemoglobin 07/24/2024 14.4  13.0 - 17.7 g/dL Final    Hematocrit 07/24/2024 44.1  37.5 - 51.0 % Final    MCV " 07/24/2024 93  79 - 97 fL Final    MCH 07/24/2024 30.4  26.6 - 33.0 pg Final    MCHC 07/24/2024 32.7  31.5 - 35.7 g/dL Final    RDW 07/24/2024 12.3  11.6 - 15.4 % Final    Platelets 07/24/2024 184  150 - 450 x10E3/uL Final    Neutrophil Rel % 07/24/2024 63  Not Estab. % Final    Lymphocyte Rel % 07/24/2024 26  Not Estab. % Final    Monocyte Rel % 07/24/2024 8  Not Estab. % Final    Eosinophil Rel % 07/24/2024 2  Not Estab. % Final    Basophil Rel % 07/24/2024 1  Not Estab. % Final    Neutrophils Absolute 07/24/2024 3.2  1.4 - 7.0 x10E3/uL Final    Lymphocytes Absolute 07/24/2024 1.3  0.7 - 3.1 x10E3/uL Final    Monocytes Absolute 07/24/2024 0.4  0.1 - 0.9 x10E3/uL Final    Eosinophils Absolute 07/24/2024 0.1  0.0 - 0.4 x10E3/uL Final    Basophils Absolute 07/24/2024 0.0  0.0 - 0.2 x10E3/uL Final    Immature Granulocyte Rel % 07/24/2024 0  Not Estab. % Final    Immature Grans Absolute 07/24/2024 0.0  0.0 - 0.1 x10E3/uL Final    Glucose 07/24/2024 90  70 - 99 mg/dL Final    BUN 07/24/2024 19  8 - 27 mg/dL Final    Creatinine 07/24/2024 1.17  0.76 - 1.27 mg/dL Final    EGFR Result 07/24/2024 66  >59 mL/min/1.73 Final    BUN/Creatinine Ratio 07/24/2024 16  10 - 24 Final    Sodium 07/24/2024 141  134 - 144 mmol/L Final    Potassium 07/24/2024 5.0  3.5 - 5.2 mmol/L Final    Chloride 07/24/2024 103  96 - 106 mmol/L Final    Total CO2 07/24/2024 26  20 - 29 mmol/L Final    Calcium 07/24/2024 9.9  8.6 - 10.2 mg/dL Final    Total Protein 07/24/2024 6.8  6.0 - 8.5 g/dL Final    Albumin 07/24/2024 4.5  3.8 - 4.8 g/dL Final    Globulin 07/24/2024 2.3  1.5 - 4.5 g/dL Final    Total Bilirubin 07/24/2024 0.6  0.0 - 1.2 mg/dL Final    Alkaline Phosphatase 07/24/2024 104  44 - 121 IU/L Final    AST (SGOT) 07/24/2024 19  0 - 40 IU/L Final    ALT (SGPT) 07/24/2024 21  0 - 44 IU/L Final    Total Cholesterol 07/24/2024 143  100 - 199 mg/dL Final    Triglycerides 07/24/2024 97  0 - 149 mg/dL Final    HDL Cholesterol 07/24/2024 42  >39  mg/dL Final    VLDL Cholesterol [place] 07/24/2024 18  5 - 40 mg/dL Final    LDL Chol Calc ([facility]) 07/24/2024 83  0 - 99 mg/dL Final    PSA 07/24/2024 1.9  0.0 - 4.0 ng/mL Final    Comment: Roche ECLIA methodology.  According to the American U[employer] Association, Serum PSA should  decrease and remain at undetectable levels after radical  prostatectomy. The AUA defines biochemical recurrence as an initial  PSA value 0.2 ng/mL or greater followed by a subsequent confirmatory  PSA value 0.2 ng/mL or greater.  Values obtained with different assay methods or kits cannot be used  interchangeably. Results cannot be interpreted as absolute evidence  of the presence or absence of malignant disease.      Hemoglobin A1C 07/24/2024 5.7 (H)  4.8 - 5.6 % Final    Comment:          Prediabetes: 5.7 - 6.4           Diabetes: >6.4           Glycemic control for adults with diabetes: <7.0         BMI is >= 25 and <30. (Overweight) The following options were offered after discussion;: exercise counseling/recommendations and nutrition counseling/recommendations     Assessment & Plan   Diagnoses and all orders for this visit:    1. Benign essential hypertension (Primary)    2. Mixed hyperlipidemia    3. Fasting hyperglycemia    4. Stage 1 mild COPD by GOLD classification  -     Ventolin  (90 Base) MCG/ACT inhaler; Inhale 2 puffs Every 4 (Four) Hours As Needed for Wheezing.  Dispense: 18 g; Refill: 5    5. Benign prostatic hyperplasia without lower urinary tract symptoms    6. JERONIMO on CPAP    7. Nodule of right lobe of thyroid gland    8. Multiple pulmonary nodules    9. Prediabetes    10. Seasonal allergic rhinitis due to pollen  -     Ambulatory Referral to Allergy    Status of multiple chronic issues reviewed as above.  He just had some blood work done.  I reviewed that with him today.  Blood pressure is under very good control.  Continue lisinopril 5 mg/day.  COPD is stable.  Refill Ventolin.  Continue Daliresp.  I have prescribed  inhalers before, but the did not want to continue those.  A1c was 5.7%.  Discussed prediabetes with him.  Recommend keeping simple sugars to a minimum in his diet.  Continue daily Flomax for BPH.  We will continue to do CT scan of the chest and ultrasound of the thyroid according to the schedule laid out above.  Will make a referral to the family allergy and asthma for his chronic congestion.  Overall, I spent 30 minutes on the day of service discussing everything above and below with Malvin.        Call with any problems or concerns before next visit       Return in about 6 months (around 1/29/2025).      Much of this report is an electronic transcription of spoken language to printed text using Dragon dictation software.  As such, the subtleties and finesse of spoken language may permit erroneous, or at times, nonsensical words or phrases to be inadvertently transcribed; thus changes may be made at a later date to rectify these errors.     Lidia Hirsch MD7/29/202410:11 EDT  This note has been electronically signed

## 2024-07-29 ENCOUNTER — OFFICE VISIT (OUTPATIENT)
Dept: FAMILY MEDICINE CLINIC | Facility: CLINIC | Age: 74
End: 2024-07-29
Payer: MEDICARE

## 2024-07-29 VITALS
HEART RATE: 72 BPM | WEIGHT: 184.6 LBS | BODY MASS INDEX: 25 KG/M2 | TEMPERATURE: 97.5 F | HEIGHT: 72 IN | DIASTOLIC BLOOD PRESSURE: 65 MMHG | SYSTOLIC BLOOD PRESSURE: 110 MMHG | OXYGEN SATURATION: 97 %

## 2024-07-29 DIAGNOSIS — R73.03 PREDIABETES: ICD-10-CM

## 2024-07-29 DIAGNOSIS — J30.1 SEASONAL ALLERGIC RHINITIS DUE TO POLLEN: ICD-10-CM

## 2024-07-29 DIAGNOSIS — G47.33 OSA ON CPAP: ICD-10-CM

## 2024-07-29 DIAGNOSIS — J44.9 STAGE 1 MILD COPD BY GOLD CLASSIFICATION: ICD-10-CM

## 2024-07-29 DIAGNOSIS — R73.01 FASTING HYPERGLYCEMIA: ICD-10-CM

## 2024-07-29 DIAGNOSIS — R91.8 MULTIPLE PULMONARY NODULES: ICD-10-CM

## 2024-07-29 DIAGNOSIS — I10 BENIGN ESSENTIAL HYPERTENSION: Primary | ICD-10-CM

## 2024-07-29 DIAGNOSIS — E04.1 NODULE OF RIGHT LOBE OF THYROID GLAND: ICD-10-CM

## 2024-07-29 DIAGNOSIS — E78.2 MIXED HYPERLIPIDEMIA: ICD-10-CM

## 2024-07-29 DIAGNOSIS — N40.0 BENIGN PROSTATIC HYPERPLASIA WITHOUT LOWER URINARY TRACT SYMPTOMS: ICD-10-CM

## 2024-07-29 PROCEDURE — 1159F MED LIST DOCD IN RCRD: CPT | Performed by: FAMILY MEDICINE

## 2024-07-29 PROCEDURE — 99214 OFFICE O/P EST MOD 30 MIN: CPT | Performed by: FAMILY MEDICINE

## 2024-07-29 PROCEDURE — 1126F AMNT PAIN NOTED NONE PRSNT: CPT | Performed by: FAMILY MEDICINE

## 2024-07-29 PROCEDURE — 1160F RVW MEDS BY RX/DR IN RCRD: CPT | Performed by: FAMILY MEDICINE

## 2024-07-29 PROCEDURE — 3074F SYST BP LT 130 MM HG: CPT | Performed by: FAMILY MEDICINE

## 2024-07-29 PROCEDURE — 3078F DIAST BP <80 MM HG: CPT | Performed by: FAMILY MEDICINE

## 2024-07-29 RX ORDER — ALBUTEROL SULFATE 90 UG/1
2 AEROSOL, METERED RESPIRATORY (INHALATION) EVERY 4 HOURS PRN
Qty: 18 G | Refills: 5 | Status: SHIPPED | OUTPATIENT
Start: 2024-07-29

## 2024-09-11 ENCOUNTER — OFFICE VISIT (OUTPATIENT)
Dept: FAMILY MEDICINE CLINIC | Facility: CLINIC | Age: 74
End: 2024-09-11
Payer: MEDICARE

## 2024-09-11 VITALS
BODY MASS INDEX: 25.14 KG/M2 | OXYGEN SATURATION: 96 % | DIASTOLIC BLOOD PRESSURE: 71 MMHG | SYSTOLIC BLOOD PRESSURE: 126 MMHG | HEART RATE: 78 BPM | TEMPERATURE: 98 F | WEIGHT: 185.6 LBS | HEIGHT: 72 IN | RESPIRATION RATE: 18 BRPM

## 2024-09-11 DIAGNOSIS — Z00.00 PHYSICAL EXAM, ANNUAL: Primary | ICD-10-CM

## 2024-09-11 PROCEDURE — 1126F AMNT PAIN NOTED NONE PRSNT: CPT | Performed by: FAMILY MEDICINE

## 2024-09-11 PROCEDURE — 1160F RVW MEDS BY RX/DR IN RCRD: CPT | Performed by: FAMILY MEDICINE

## 2024-09-11 PROCEDURE — 3078F DIAST BP <80 MM HG: CPT | Performed by: FAMILY MEDICINE

## 2024-09-11 PROCEDURE — 1170F FXNL STATUS ASSESSED: CPT | Performed by: FAMILY MEDICINE

## 2024-09-11 PROCEDURE — 1159F MED LIST DOCD IN RCRD: CPT | Performed by: FAMILY MEDICINE

## 2024-09-11 PROCEDURE — 3074F SYST BP LT 130 MM HG: CPT | Performed by: FAMILY MEDICINE

## 2024-09-11 PROCEDURE — G0439 PPPS, SUBSEQ VISIT: HCPCS | Performed by: FAMILY MEDICINE

## 2024-09-11 NOTE — PROGRESS NOTES
Subjective   The ABCs of the Annual Wellness Visit  Medicare Wellness Visit      Malvin Bustillo is a 74 y.o. patient who presents for a Medicare Wellness Visit.    The following portions of the patient's history were reviewed and   updated as appropriate: allergies, current medications, past family history, past medical history, past social history, past surgical history, and problem list.    Compared to one year ago, the patient's physical   health is worse.  Compared to one year ago, the patient's mental   health is the same.    Recent Hospitalizations:  He was not admitted to the hospital during the last year.     Current Medical Providers:  Patient Care Team:  Lidia Hirsch MD as PCP - General (Family Medicine)  Dee Abdul as Technologist    Outpatient Medications Prior to Visit   Medication Sig Dispense Refill    aspirin (aspirin) 81 MG EC tablet Take 1 tablet by mouth Daily.      diclofenac (VOLTAREN) 50 MG EC tablet TAKE 1 TABLET BY MOUTH TWICE DAILY AS NEEDED FOR  JOINT  PAIN 180 tablet 3    L-Lysine 500 MG tablet tablet Take 1 tablet by mouth Daily.      lisinopril (PRINIVIL,ZESTRIL) 5 MG tablet Take 1 tablet by mouth once daily 90 tablet 3    MULTIPLE VITAMIN PO Take 1 tablet by mouth Daily.      roflumilast (DALIRESP) 500 MCG tablet tablet Take 1 tablet by mouth once daily 90 tablet 3    rosuvastatin (CRESTOR) 5 MG tablet Take 1 tablet by mouth once daily 90 tablet 3    tamsulosin (FLOMAX) 0.4 MG capsule 24 hr capsule Take 1 capsule by mouth Daily. 90 capsule 3    Ventolin  (90 Base) MCG/ACT inhaler Inhale 2 puffs Every 4 (Four) Hours As Needed for Wheezing. 18 g 5    Zinc 50 MG tablet Take 1 tablet by mouth Daily.       No facility-administered medications prior to visit.     No opioid medication identified on active medication list. I have reviewed chart for other potential  high risk medication/s and harmful drug interactions in the elderly.      Aspirin is on active medication  "list. Aspirin use is indicated based on review of current medical condition/s. Pros and cons of this therapy have been discussed today. Benefits of this medication outweigh potential harm.  Patient has been encouraged to continue taking this medication.  .      Patient Active Problem List   Diagnosis    Adenomatous polyp of colon    Benign essential hypertension    Benign prostatic hyperplasia    Body mass index (BMI) of 25.0 to 29.9    Constipation    Degenerative joint disease    Degenerative joint disease of left knee    Mixed hyperlipidemia    Intermittent vertigo    Knee pain, left    Other dorsalgia    Recurrent urinary tract infection    Seborrheic keratosis    Lumbar degenerative disc disease    JERONIMO on CPAP    Cough    Nodule of right lobe of thyroid gland    Stage 1 mild COPD by GOLD classification    Multiple pulmonary nodules    Prediabetes     Advance Care Planning Advance Directive is on file.  ACP discussion was held with the patient during this visit. Patient has an advance directive in EMR which is still valid.             Objective   Vitals:    09/11/24 0855   BP: 126/71   BP Location: Right arm   Patient Position: Sitting   Cuff Size: Adult   Pulse: 78   Resp: 18   Temp: 98 °F (36.7 °C)   TempSrc: Infrared   SpO2: 96%   Weight: 84.2 kg (185 lb 9.6 oz)   Height: 182.9 cm (72.01\")       Estimated body mass index is 25.16 kg/m² as calculated from the following:    Height as of this encounter: 182.9 cm (72.01\").    Weight as of this encounter: 84.2 kg (185 lb 9.6 oz).            Does the patient have evidence of cognitive impairment? No  Lab Results   Component Value Date    CHLPL 143 07/24/2024    TRIG 97 07/24/2024    HDL 42 07/24/2024    LDL 83 07/24/2024    VLDL 18 07/24/2024    HGBA1C 5.7 (H) 07/24/2024                                                                                               Health  Risk Assessment    Smoking Status:  Social History     Tobacco Use   Smoking Status Former    " Current packs/day: 0.00    Average packs/day: 2.0 packs/day for 10.0 years (20.0 ttl pk-yrs)    Types: Cigarettes    Start date:     Quit date:     Years since quittin.7    Passive exposure: Past   Smokeless Tobacco Never     Alcohol Consumption:  Social History     Substance and Sexual Activity   Alcohol Use Yes    Alcohol/week: 8.0 standard drinks of alcohol    Types: 8 Cans of beer per week       Fall Risk Screen  STEADI Fall Risk Assessment was completed, and patient is at LOW risk for falls.Assessment completed on:2024    Depression Screenin/11/2024     8:54 AM   PHQ-2/PHQ-9 Depression Screening   Little Interest or Pleasure in Doing Things 0-->not at all   Feeling Down, Depressed or Hopeless 0-->not at all   PHQ-9: Brief Depression Severity Measure Score 0     Health Habits and Functional and Cognitive Screenin/11/2024     8:51 AM   Functional & Cognitive Status   Do you have difficulty preparing food and eating? No   Do you have difficulty bathing yourself, getting dressed or grooming yourself? No   Do you have difficulty using the toilet? No   Do you have difficulty moving around from place to place? No   Do you have trouble with steps or getting out of a bed or a chair? No   Current Diet Well Balanced Diet   Dental Exam Up to date   Eye Exam Up to date   Exercise (times per week) 7 times per week   Current Exercises Include Stair Step Machine   Do you need help using the phone?  No   Are you deaf or do you have serious difficulty hearing?  No   Do you need help to go to places out of walking distance? No   Do you need help shopping? No   Do you need help preparing meals?  No   Do you need help with housework?  No   Do you need help with laundry? No   Do you need help taking your medications? No   Do you need help managing money? No   Do you ever drive or ride in a car without wearing a seat belt? No   Have you felt unusual stress, anger or loneliness in the last month? No    Who do you live with? Spouse   If you need help, do you have trouble finding someone available to you? No   Have you been bothered in the last four weeks by sexual problems? No   Do you have difficulty concentrating, remembering or making decisions? No           Age-appropriate Screening Schedule:  Refer to the list below for future screening recommendations based on patient's age, sex and/or medical conditions. Orders for these recommended tests are listed in the plan section. The patient has been provided with a written plan.    Health Maintenance List  Health Maintenance   Topic Date Due    TDAP/TD VACCINES (1 - Tdap) Never done    ZOSTER VACCINE (1 of 2) Never done    COVID-19 Vaccine (7 - 2023-24 season) 09/01/2024    INFLUENZA VACCINE  08/01/2024    LIPID PANEL  07/24/2025    BMI FOLLOWUP  07/29/2025    ANNUAL WELLNESS VISIT  09/11/2025    COLORECTAL CANCER SCREENING  03/08/2029    HEPATITIS C SCREENING  Completed    Pneumococcal Vaccine 65+  Completed    AAA SCREEN (ONE-TIME)  Completed    LUNG CANCER SCREENING  Discontinued                                                                                                                                                CMS Preventative Services Quick Reference  Risk Factors Identified During Encounter  Immunizations Discussed/Encouraged: Tdap, Influenza, and Shingrix    The above risks/problems have been discussed with the patient.  Pertinent information has been shared with the patient in the After Visit Summary.  An After Visit Summary and PPPS were made available to the patient.    Follow Up:   Next Medicare Wellness visit to be scheduled in 1 year.     Assessment & Plan  Physical exam, annual               Follow Up:   Return keep f/u in January as planned.

## 2025-02-05 ENCOUNTER — OFFICE VISIT (OUTPATIENT)
Dept: FAMILY MEDICINE CLINIC | Facility: CLINIC | Age: 75
End: 2025-02-05
Payer: MEDICARE

## 2025-02-05 VITALS
HEART RATE: 67 BPM | OXYGEN SATURATION: 98 % | TEMPERATURE: 98 F | RESPIRATION RATE: 18 BRPM | DIASTOLIC BLOOD PRESSURE: 68 MMHG | WEIGHT: 186.4 LBS | HEIGHT: 72 IN | SYSTOLIC BLOOD PRESSURE: 114 MMHG | BODY MASS INDEX: 25.25 KG/M2

## 2025-02-05 DIAGNOSIS — R91.8 MULTIPLE PULMONARY NODULES: ICD-10-CM

## 2025-02-05 DIAGNOSIS — R73.03 PREDIABETES: ICD-10-CM

## 2025-02-05 DIAGNOSIS — E04.1 NODULE OF RIGHT LOBE OF THYROID GLAND: ICD-10-CM

## 2025-02-05 DIAGNOSIS — N40.0 BENIGN PROSTATIC HYPERPLASIA WITHOUT LOWER URINARY TRACT SYMPTOMS: ICD-10-CM

## 2025-02-05 DIAGNOSIS — G47.33 OSA ON CPAP: ICD-10-CM

## 2025-02-05 DIAGNOSIS — E78.2 MIXED HYPERLIPIDEMIA: ICD-10-CM

## 2025-02-05 DIAGNOSIS — J44.9 STAGE 1 MILD COPD BY GOLD CLASSIFICATION: ICD-10-CM

## 2025-02-05 DIAGNOSIS — I10 BENIGN ESSENTIAL HYPERTENSION: Primary | ICD-10-CM

## 2025-02-05 PROCEDURE — 1159F MED LIST DOCD IN RCRD: CPT | Performed by: FAMILY MEDICINE

## 2025-02-05 PROCEDURE — 1160F RVW MEDS BY RX/DR IN RCRD: CPT | Performed by: FAMILY MEDICINE

## 2025-02-05 PROCEDURE — 3074F SYST BP LT 130 MM HG: CPT | Performed by: FAMILY MEDICINE

## 2025-02-05 PROCEDURE — 1126F AMNT PAIN NOTED NONE PRSNT: CPT | Performed by: FAMILY MEDICINE

## 2025-02-05 PROCEDURE — 3078F DIAST BP <80 MM HG: CPT | Performed by: FAMILY MEDICINE

## 2025-02-05 PROCEDURE — 99214 OFFICE O/P EST MOD 30 MIN: CPT | Performed by: FAMILY MEDICINE

## 2025-02-05 RX ORDER — AZELASTINE HYDROCHLORIDE 0.5 MG/ML
1 SOLUTION/ DROPS OPHTHALMIC 2 TIMES DAILY
COMMUNITY
Start: 2025-01-28

## 2025-02-05 NOTE — PROGRESS NOTES
Subjective   Malvin Bustillo is a 74 y.o. male.   Chief Complaint   Patient presents with    Prediabetes    Hypertension    Hyperlipidemia    COPD       History of Present Illness   74 y.o. male with history of prediabetes, COPD, JERONIMO, HLD, HTN, lumbar DDD presents to the office today to follow-up.  I did a Medicare wellness back in September.  Otherwise I typically follow-up with him twice a year.    Thyroid nodules-will repeat thyroid ultrasound in February 2026     Multiple small pulmonary nodules-will be due for repeat CT of the chest in February 2025.     JERONIMO-followed by Dr. Nur     COPD- stable.  Needs refill on albuterol.     HTN - Brings list of B/P numbers - SBP average less than 130.         HLD-lipid panel 6 months ago was excellent.  Total cholesterol 143, LDL 83, HDL 42, triglycerides 97.     BPH-he has seeing urology.  PSA back in February of 2024 was 1.1.  We repeated it 6 months ago with his labs and it was 1.9.   1 year ago, July 17, 2023-PSA was 2.3.     Prediabetes-last A1c 6 months ago was 5.7%.    He had a colonoscopy with biopsies March 2024.  History of Present Illness  The patient presents for evaluation of hypertension, pulmonary nodules, prediabetes, stage 1 COPD, and allergies.    He reports experiencing elevated blood pressure readings during the night, with systolic values typically ranging between 127 and 137 in the afternoon.    He has expressed interest in receiving allergy injections. He has previously consulted with Dr. Azael Bauman on two occasions, during which he provided a comprehensive list of his current medications. Despite this, Dr. Bauman prescribed an inhaler, even though he was already using Ventolin, as prescribed by Dr. Murillo. He was informed that his insurance would not cover the cost of the new inhaler, but he could obtain it for free with a provided card. However, when he attempted to fill the prescription at [facility], he was charged over $ 200. His wife subsequently  received a text from [employer] indicating that a different inhaler, albuterol, was available for pickup. He has a scheduled appointment with Dr. Bauman in May 2024, during which he plans to discuss the possibility of initiating allergy injections. During his last visit, Dr. Bauman recommended the use of an allergy nasal spray, allergy tablets, and a saline nasal spray. He has already procured these items. He believes he needs to receive 12 injections over a 3-month period, followed by annual maintenance injections. He is currently verifying whether his insurance will cover the cost of these injections.    He is scheduled for a blood draw on 02/10/2024, followed by a consultation with his urologist on 02/27/2024. He maintains an annual follow-up schedule with his urologist.    SOCIAL HISTORY  He does not smoke.    MEDICATIONS  Current: Ventolin, albuterol      Patient Active Problem List    Diagnosis Date Noted    Multiple pulmonary nodules 07/25/2024    Prediabetes 07/25/2024    Stage 1 mild COPD by GOLD classification 01/26/2024    Nodule of right lobe of thyroid gland 03/12/2023     Note Last Updated: 3/12/2023     On U/S -moderately suspicious.  Incidentally identified on CT scan of the chest.      Cough 12/15/2022    JERONIMO on CPAP 09/26/2019     Note Last Updated: 9/26/2019     Dr. Nur      Lumbar degenerative disc disease 08/06/2019     Note Last Updated: 8/6/2019     Imaged on abd series 2018      Mixed hyperlipidemia 08/05/2019    Constipation 10/01/2018    Other dorsalgia 06/14/2018    Benign prostatic hyperplasia 04/02/2018    Degenerative joint disease of left knee 01/11/2018    Benign essential hypertension 12/11/2017    Knee pain, left 12/11/2017    Seborrheic keratosis 12/11/2017    Recurrent urinary tract infection 10/10/2017    Adenomatous polyp of colon 06/20/2016    Body mass index (BMI) of 25.0 to 29.9 12/21/2015    Intermittent vertigo 12/05/2014    Degenerative joint disease 01/01/1960           Past  Surgical History:   Procedure Laterality Date    COLONOSCOPY  2013= TA/ 2018= NEG, Re[facility]     HEMORRHOIDECTOMY      INGUINAL HERNIA REPAIR Left     ORCHIECTOMY Left     secondary due to hernia incarceration    TONSILLECTOMY AND ADENOIDECTOMY       Current Outpatient Medications on File Prior to Visit   Medication Sig    aspirin (aspirin) 81 MG EC tablet Take 1 tablet by mouth Daily.    azelastine (OPTIVAR) 0.05 % ophthalmic solution Administer 1 drop to both eyes 2 (Two) Times a Day.    diclofenac (VOLTAREN) 50 MG EC tablet TAKE 1 TABLET BY MOUTH TWICE DAILY AS NEEDED FOR  JOINT  PAIN    L-Lysine 500 MG tablet tablet Take 1 tablet by mouth Daily.    lisinopril (PRINIVIL,ZESTRIL) 5 MG tablet Take 1 tablet by mouth once daily    MULTIPLE VITAMIN PO Take 1 tablet by mouth Daily.    roflumilast (DALIRESP) 500 MCG tablet tablet Take 1 tablet by mouth once daily    rosuvastatin (CRESTOR) 5 MG tablet Take 1 tablet by mouth once daily    tamsulosin (FLOMAX) 0.4 MG capsule 24 hr capsule Take 1 capsule by mouth Daily.    Ventolin  (90 Base) MCG/ACT inhaler Inhale 2 puffs Every 4 (Four) Hours As Needed for Wheezing.    Zinc 50 MG tablet Take 1 tablet by mouth Daily.     No current facility-administered medications on file prior to visit.     Allergies   Allergen Reactions    Lipitor [Atorvastatin Calcium] Myalgia     Social History     Socioeconomic History    Marital status:    Tobacco Use    Smoking status: Former     Current packs/day: 0.00     Average packs/day: 2.0 packs/day for 10.0 years (20.0 ttl pk-yrs)     Types: Cigarettes     Start date:      Quit date:      Years since quittin.1     Passive exposure: Past    Smokeless tobacco: Never   Vaping Use    Vaping status: Never Used   Substance and Sexual Activity    Alcohol use: Yes     Alcohol/week: 8.0 standard drinks of alcohol     Types: 8 Cans of beer per week    Drug use: No    Sexual activity: Yes     Partners: Female     Family  "History   Problem Relation Age of Onset    Hypertension Mother     Heart disease Mother     Depression Mother     Osteoporosis Mother     Skin cancer Mother     Thyroid disease Mother     Coronary artery disease Mother     Heart disease Father     Diabetes Father     Coronary artery disease Father     Hypertension Brother     Heart disease Brother     No Known Problems Sister        Review of Systems    Objective   /68 (BP Location: Right arm, Patient Position: Sitting, Cuff Size: Adult)   Pulse 67   Temp 98 °F (36.7 °C) (Infrared)   Resp 18   Ht 182.9 cm (72.01\")   Wt 84.6 kg (186 lb 6.4 oz)   SpO2 98%   BMI 25.27 kg/m²   Physical Exam  Constitutional:       Appearance: He is well-developed.      Comments:      HENT:      Head: Normocephalic and atraumatic.   Eyes:      Conjunctiva/sclera: Conjunctivae normal.   Cardiovascular:      Rate and Rhythm: Normal rate and regular rhythm.      Heart sounds: No murmur heard.  Pulmonary:      Effort: Pulmonary effort is normal. No respiratory distress.      Breath sounds: No wheezing, rhonchi or rales.   Musculoskeletal:         General: Normal range of motion.      Cervical back: Normal range of motion.   Skin:     General: Skin is warm and dry.      Findings: No rash.   Neurological:      Mental Status: He is alert and oriented to person, place, and time.   Psychiatric:         Behavior: Behavior normal.       Physical Exam  Lungs are clear to auscultation. No wheezing.  Heart sounds are normal.      No visits with results within 4 Month(s) from this visit.   Latest known visit with results is:   Results Encounter on 07/24/2024   Component Date Value Ref Range Status    WBC 07/24/2024 5.0  3.4 - 10.8 x10E3/uL Final    RBC 07/24/2024 4.73  4.14 - 5.80 x10E6/uL Final    Hemoglobin 07/24/2024 14.4  13.0 - 17.7 g/dL Final    Hematocrit 07/24/2024 44.1  37.5 - 51.0 % Final    MCV 07/24/2024 93  79 - 97 fL Final    MCH 07/24/2024 30.4  26.6 - 33.0 pg Final    MCHC " 07/24/2024 32.7  31.5 - 35.7 g/dL Final    RDW 07/24/2024 12.3  11.6 - 15.4 % Final    Platelets 07/24/2024 184  150 - 450 x10E3/uL Final    Neutrophil Rel % 07/24/2024 63  Not Estab. % Final    Lymphocyte Rel % 07/24/2024 26  Not Estab. % Final    Monocyte Rel % 07/24/2024 8  Not Estab. % Final    Eosinophil Rel % 07/24/2024 2  Not Estab. % Final    Basophil Rel % 07/24/2024 1  Not Estab. % Final    Neutrophils Absolute 07/24/2024 3.2  1.4 - 7.0 x10E3/uL Final    Lymphocytes Absolute 07/24/2024 1.3  0.7 - 3.1 x10E3/uL Final    Monocytes Absolute 07/24/2024 0.4  0.1 - 0.9 x10E3/uL Final    Eosinophils Absolute 07/24/2024 0.1  0.0 - 0.4 x10E3/uL Final    Basophils Absolute 07/24/2024 0.0  0.0 - 0.2 x10E3/uL Final    Immature Granulocyte Rel % 07/24/2024 0  Not Estab. % Final    Immature Grans Absolute 07/24/2024 0.0  0.0 - 0.1 x10E3/uL Final    Glucose 07/24/2024 90  70 - 99 mg/dL Final    BUN 07/24/2024 19  8 - 27 mg/dL Final    Creatinine 07/24/2024 1.17  0.76 - 1.27 mg/dL Final    EGFR Result 07/24/2024 66  >59 mL/min/1.73 Final    BUN/Creatinine Ratio 07/24/2024 16  10 - 24 Final    Sodium 07/24/2024 141  134 - 144 mmol/L Final    Potassium 07/24/2024 5.0  3.5 - 5.2 mmol/L Final    Chloride 07/24/2024 103  96 - 106 mmol/L Final    Total CO2 07/24/2024 26  20 - 29 mmol/L Final    Calcium 07/24/2024 9.9  8.6 - 10.2 mg/dL Final    Total Protein 07/24/2024 6.8  6.0 - 8.5 g/dL Final    Albumin 07/24/2024 4.5  3.8 - 4.8 g/dL Final    Globulin 07/24/2024 2.3  1.5 - 4.5 g/dL Final    Total Bilirubin 07/24/2024 0.6  0.0 - 1.2 mg/dL Final    Alkaline Phosphatase 07/24/2024 104  44 - 121 IU/L Final    AST (SGOT) 07/24/2024 19  0 - 40 IU/L Final    ALT (SGPT) 07/24/2024 21  0 - 44 IU/L Final    Total Cholesterol 07/24/2024 143  100 - 199 mg/dL Final    Triglycerides 07/24/2024 97  0 - 149 mg/dL Final    HDL Cholesterol 07/24/2024 42  >39 mg/dL Final    VLDL Cholesterol [place] 07/24/2024 18  5 - 40 mg/dL Final    LDL Chol Calc  ([facility]) 07/24/2024 83  0 - 99 mg/dL Final    PSA 07/24/2024 1.9  0.0 - 4.0 ng/mL Final    Comment: Roche ECLIA methodology.  According to the American Urological Association, Serum PSA should  decrease and remain at undetectable levels after radical  prostatectomy. The AUA defines biochemical recurrence as an initial  PSA value 0.2 ng/mL or greater followed by a subsequent confirmatory  PSA value 0.2 ng/mL or greater.  Values obtained with different assay methods or kits cannot be used  interchangeably. Results cannot be interpreted as absolute evidence  of the presence or absence of malignant disease.      Hemoglobin A1C 07/24/2024 5.7 (H)  4.8 - 5.6 % Final    Comment:          Prediabetes: 5.7 - 6.4           Diabetes: >6.4           Glycemic control for adults with diabetes: <7.0       Results  Laboratory Studies  Creatinine level 2 weeks ago was 1.29.          Assessment & Plan   Diagnoses and all orders for this visit:    1. Benign essential hypertension (Primary)  -     CBC & Differential; Future  -     Comprehensive Metabolic Panel; Future    2. Mixed hyperlipidemia    3. Prediabetes  -     Hemoglobin A1c; Future    4. Benign prostatic hyperplasia without lower urinary tract symptoms    5. Stage 1 mild COPD by GOLD classification    6. Multiple pulmonary nodules  -     CT Chest Without Contrast; Future    7. JERONIMO on CPAP    8. Nodule of right lobe of thyroid gland      Assessment & Plan  1. Hypertension.  His blood pressure readings are generally within the normal range, with occasional elevations at night. Control of high blood pressure is defined as 85% of the numbers being below 140. He is advised that occasional spikes are acceptable as long as they return to normal. If most readings are above 140, further intervention will be necessary.  Continue lisinopril at current dose.    2. Pulmonary nodules.  He has 3 mm nodules identified on 02/12/2024. A CT scan of the chest has been ordered to monitor these  nodules. The results will be reviewed, and he will be notified accordingly.  Will continue to follow the CT scans annually.      3. Prediabetes.  His A1c was last checked 6 months ago. A repeat A1c test has been ordered to be done in 5 days, coinciding with his urology appointment. This will allow for both the urologist and the primary care provider to receive the results.    4. Stage 1 COPD.  He is currently using an albuterol inhaler as needed. His lung function remains stable, and there is no requirement for supplemental oxygen at this time. He is advised to continue using the albuterol inhaler as needed. If symptoms worsen, such as increased wheezing or shortness of breath, a long-acting albuterol inhaler may be considered.    5. Allergies.  He is considering allergy shots as a long-term solution for his allergies. He is advised that allergy shots are effective but take time to work. He should check with his insurance regarding coverage and any administration co-pay.    Follow-up  The patient will follow up in 6 months.        Call with any problems or concerns before next visit       Return in about 6 months (around 8/5/2025).  Patient or patient representative verbalized consent for the use of Ambient Listening during the visit with  Lidia Hirsch MD for chart documentation. 2/5/2025  11:13 EST    Part of this note may be an electronic transcription/translation of spoken language to printed text using the Dragon Dictation System    Lidia Hirsch MD2/5/202511:14 EST  This note has been electronically signed

## 2025-02-19 ENCOUNTER — HOSPITAL ENCOUNTER (OUTPATIENT)
Dept: CT IMAGING | Facility: HOSPITAL | Age: 75
Discharge: HOME | End: 2025-02-19
Admitting: FAMILY MEDICINE
Payer: MEDICARE

## 2025-02-19 DIAGNOSIS — R91.8 MULTIPLE PULMONARY NODULES: ICD-10-CM

## 2025-02-19 PROCEDURE — 71250 CT THORAX DX C-: CPT

## 2025-02-25 DIAGNOSIS — I10 BENIGN ESSENTIAL HYPERTENSION: ICD-10-CM

## 2025-02-25 RX ORDER — LISINOPRIL 5 MG/1
TABLET ORAL
Qty: 90 TABLET | Refills: 3 | Status: SHIPPED | OUTPATIENT
Start: 2025-02-25

## 2025-04-23 RX ORDER — ROFLUMILAST 500 UG/1
500 TABLET ORAL DAILY
Qty: 90 TABLET | Refills: 3 | Status: SHIPPED | OUTPATIENT
Start: 2025-04-23

## 2025-05-09 DIAGNOSIS — E78.2 MIXED HYPERLIPIDEMIA: ICD-10-CM

## 2025-05-09 RX ORDER — ROSUVASTATIN CALCIUM 5 MG/1
5 TABLET, COATED ORAL DAILY
Qty: 90 TABLET | Refills: 3 | Status: SHIPPED | OUTPATIENT
Start: 2025-05-09

## 2025-07-07 DIAGNOSIS — M51.369 LUMBAR DEGENERATIVE DISC DISEASE: ICD-10-CM

## 2025-08-08 ENCOUNTER — OFFICE VISIT (OUTPATIENT)
Dept: FAMILY MEDICINE CLINIC | Facility: CLINIC | Age: 75
End: 2025-08-08
Payer: MEDICARE

## 2025-08-08 VITALS
OXYGEN SATURATION: 97 % | RESPIRATION RATE: 18 BRPM | TEMPERATURE: 97.8 F | BODY MASS INDEX: 23.49 KG/M2 | HEIGHT: 72 IN | DIASTOLIC BLOOD PRESSURE: 66 MMHG | WEIGHT: 173.4 LBS | HEART RATE: 88 BPM | SYSTOLIC BLOOD PRESSURE: 109 MMHG

## 2025-08-08 DIAGNOSIS — Z12.5 ENCOUNTER FOR PROSTATE CANCER SCREENING: ICD-10-CM

## 2025-08-08 DIAGNOSIS — R63.4 WEIGHT LOSS, UNINTENTIONAL: ICD-10-CM

## 2025-08-08 DIAGNOSIS — I10 BENIGN ESSENTIAL HYPERTENSION: Primary | ICD-10-CM

## 2025-08-08 DIAGNOSIS — R10.84 GENERALIZED ABDOMINAL PAIN: ICD-10-CM

## 2025-08-08 DIAGNOSIS — E04.1 NODULE OF RIGHT LOBE OF THYROID GLAND: ICD-10-CM

## 2025-08-08 DIAGNOSIS — E78.2 MIXED HYPERLIPIDEMIA: ICD-10-CM

## 2025-08-08 DIAGNOSIS — R73.03 PREDIABETES: ICD-10-CM

## 2025-08-08 DIAGNOSIS — J44.9 STAGE 1 MILD COPD BY GOLD CLASSIFICATION: ICD-10-CM

## 2025-08-08 DIAGNOSIS — R91.8 MULTIPLE PULMONARY NODULES: ICD-10-CM

## 2025-08-08 RX ORDER — LISINOPRIL 2.5 MG/1
2.5 TABLET ORAL DAILY
Qty: 90 TABLET | Refills: 3 | Status: SHIPPED | OUTPATIENT
Start: 2025-08-08

## 2025-08-09 LAB
ALBUMIN SERPL-MCNC: 4.4 G/DL (ref 3.8–4.8)
ALP SERPL-CCNC: 95 IU/L (ref 44–121)
ALT SERPL-CCNC: 18 IU/L (ref 0–44)
AST SERPL-CCNC: 19 IU/L (ref 0–40)
BASOPHILS # BLD AUTO: 0 X10E3/UL (ref 0–0.2)
BASOPHILS NFR BLD AUTO: 0 %
BILIRUB SERPL-MCNC: 0.5 MG/DL (ref 0–1.2)
BUN SERPL-MCNC: 25 MG/DL (ref 8–27)
BUN/CREAT SERPL: 20 (ref 10–24)
CALCIUM SERPL-MCNC: 9.4 MG/DL (ref 8.6–10.2)
CHLORIDE SERPL-SCNC: 103 MMOL/L (ref 96–106)
CHOLEST SERPL-MCNC: 125 MG/DL (ref 100–199)
CO2 SERPL-SCNC: 22 MMOL/L (ref 20–29)
CREAT SERPL-MCNC: 1.23 MG/DL (ref 0.76–1.27)
EGFRCR SERPLBLD CKD-EPI 2021: 62 ML/MIN/1.73
EOSINOPHIL # BLD AUTO: 0.1 X10E3/UL (ref 0–0.4)
EOSINOPHIL NFR BLD AUTO: 1 %
ERYTHROCYTE [DISTWIDTH] IN BLOOD BY AUTOMATED COUNT: 11.9 % (ref 11.6–15.4)
GLOBULIN SER CALC-MCNC: 2.1 G/DL (ref 1.5–4.5)
GLUCOSE SERPL-MCNC: 81 MG/DL (ref 70–99)
HBA1C MFR BLD: 5.3 % (ref 4.8–5.6)
HCT VFR BLD AUTO: 43.4 % (ref 37.5–51)
HDLC SERPL-MCNC: 41 MG/DL
HGB BLD-MCNC: 14.1 G/DL (ref 13–17.7)
IMM GRANULOCYTES # BLD AUTO: 0 X10E3/UL (ref 0–0.1)
IMM GRANULOCYTES NFR BLD AUTO: 0 %
LDLC SERPL CALC-MCNC: 66 MG/DL (ref 0–99)
LYMPHOCYTES # BLD AUTO: 1.2 X10E3/UL (ref 0.7–3.1)
LYMPHOCYTES NFR BLD AUTO: 19 %
MCH RBC QN AUTO: 30.9 PG (ref 26.6–33)
MCHC RBC AUTO-ENTMCNC: 32.5 G/DL (ref 31.5–35.7)
MCV RBC AUTO: 95 FL (ref 79–97)
MONOCYTES # BLD AUTO: 0.5 X10E3/UL (ref 0.1–0.9)
MONOCYTES NFR BLD AUTO: 8 %
NEUTROPHILS # BLD AUTO: 4.5 X10E3/UL (ref 1.4–7)
NEUTROPHILS NFR BLD AUTO: 72 %
PLATELET # BLD AUTO: 200 X10E3/UL (ref 150–450)
POTASSIUM SERPL-SCNC: 4.8 MMOL/L (ref 3.5–5.2)
PROT SERPL-MCNC: 6.5 G/DL (ref 6–8.5)
PSA SERPL-MCNC: 4.2 NG/ML (ref 0–4)
RBC # BLD AUTO: 4.56 X10E6/UL (ref 4.14–5.8)
SODIUM SERPL-SCNC: 140 MMOL/L (ref 134–144)
TRIGL SERPL-MCNC: 96 MG/DL (ref 0–149)
TSH SERPL DL<=0.005 MIU/L-ACNC: 1.1 UIU/ML (ref 0.45–4.5)
VLDLC SERPL CALC-MCNC: 18 MG/DL (ref 5–40)
WBC # BLD AUTO: 6.4 X10E3/UL (ref 3.4–10.8)

## 2025-08-15 ENCOUNTER — HOSPITAL ENCOUNTER (OUTPATIENT)
Dept: CT IMAGING | Facility: HOSPITAL | Age: 75
Discharge: HOME | End: 2025-08-15
Payer: MEDICARE

## 2025-08-15 DIAGNOSIS — R10.84 GENERALIZED ABDOMINAL PAIN: ICD-10-CM

## 2025-08-15 DIAGNOSIS — R63.4 WEIGHT LOSS, UNINTENTIONAL: ICD-10-CM

## 2025-08-15 PROCEDURE — 74176 CT ABD & PELVIS W/O CONTRAST: CPT
